# Patient Record
Sex: FEMALE | Race: WHITE | NOT HISPANIC OR LATINO | Employment: UNEMPLOYED | URBAN - METROPOLITAN AREA
[De-identification: names, ages, dates, MRNs, and addresses within clinical notes are randomized per-mention and may not be internally consistent; named-entity substitution may affect disease eponyms.]

---

## 2017-01-06 ENCOUNTER — COMMUNICATION - HEALTHEAST (OUTPATIENT)
Dept: FAMILY MEDICINE | Facility: CLINIC | Age: 37
End: 2017-01-06

## 2017-01-06 DIAGNOSIS — Z00.00 HEALTH CARE MAINTENANCE: ICD-10-CM

## 2017-03-10 ENCOUNTER — COMMUNICATION - HEALTHEAST (OUTPATIENT)
Dept: FAMILY MEDICINE | Facility: CLINIC | Age: 37
End: 2017-03-10

## 2017-03-10 DIAGNOSIS — Z00.00 HEALTH CARE MAINTENANCE: ICD-10-CM

## 2017-04-24 ENCOUNTER — OFFICE VISIT - HEALTHEAST (OUTPATIENT)
Dept: FAMILY MEDICINE | Facility: CLINIC | Age: 37
End: 2017-04-24

## 2017-04-24 DIAGNOSIS — J02.9 ACUTE PHARYNGITIS: ICD-10-CM

## 2017-04-24 RX ORDER — DEXTROAMPHETAMINE SACCHARATE, AMPHETAMINE ASPARTATE MONOHYDRATE, DEXTROAMPHETAMINE SULFATE AND AMPHETAMINE SULFATE 5; 5; 5; 5 MG/1; MG/1; MG/1; MG/1
CAPSULE, EXTENDED RELEASE ORAL
Refills: 0 | Status: SHIPPED | COMMUNITY
Start: 2017-04-20

## 2017-05-04 ENCOUNTER — OFFICE VISIT - HEALTHEAST (OUTPATIENT)
Dept: FAMILY MEDICINE | Facility: CLINIC | Age: 37
End: 2017-05-04

## 2017-05-04 DIAGNOSIS — J01.10 ACUTE NON-RECURRENT FRONTAL SINUSITIS: ICD-10-CM

## 2017-06-12 ENCOUNTER — COMMUNICATION - HEALTHEAST (OUTPATIENT)
Dept: FAMILY MEDICINE | Facility: CLINIC | Age: 37
End: 2017-06-12

## 2017-06-12 DIAGNOSIS — Z00.00 HEALTH CARE MAINTENANCE: ICD-10-CM

## 2017-06-23 ENCOUNTER — COMMUNICATION - HEALTHEAST (OUTPATIENT)
Dept: TELEHEALTH | Facility: CLINIC | Age: 37
End: 2017-06-23

## 2017-06-23 ENCOUNTER — OFFICE VISIT - HEALTHEAST (OUTPATIENT)
Dept: FAMILY MEDICINE | Facility: CLINIC | Age: 37
End: 2017-06-23

## 2017-06-23 DIAGNOSIS — I73.00 RAYNAUD'S DISEASE: ICD-10-CM

## 2017-06-23 DIAGNOSIS — L70.9 ACNE: ICD-10-CM

## 2017-06-23 DIAGNOSIS — Z00.00 ROUTINE GENERAL MEDICAL EXAMINATION AT A HEALTH CARE FACILITY: ICD-10-CM

## 2017-06-23 ASSESSMENT — MIFFLIN-ST. JEOR: SCORE: 1142.62

## 2017-09-01 ENCOUNTER — COMMUNICATION - HEALTHEAST (OUTPATIENT)
Dept: FAMILY MEDICINE | Facility: CLINIC | Age: 37
End: 2017-09-01

## 2017-09-01 DIAGNOSIS — Z00.00 HEALTH CARE MAINTENANCE: ICD-10-CM

## 2017-10-09 ENCOUNTER — COMMUNICATION - HEALTHEAST (OUTPATIENT)
Dept: FAMILY MEDICINE | Facility: CLINIC | Age: 37
End: 2017-10-09

## 2017-10-09 DIAGNOSIS — L70.8 OTHER ACNE: ICD-10-CM

## 2017-10-13 ENCOUNTER — AMBULATORY - HEALTHEAST (OUTPATIENT)
Dept: FAMILY MEDICINE | Facility: CLINIC | Age: 37
End: 2017-10-13

## 2017-10-13 ENCOUNTER — COMMUNICATION - HEALTHEAST (OUTPATIENT)
Dept: FAMILY MEDICINE | Facility: CLINIC | Age: 37
End: 2017-10-13

## 2017-11-16 ENCOUNTER — OFFICE VISIT - HEALTHEAST (OUTPATIENT)
Dept: FAMILY MEDICINE | Facility: CLINIC | Age: 37
End: 2017-11-16

## 2017-11-16 DIAGNOSIS — M54.2 NECK PAIN, CHRONIC: ICD-10-CM

## 2017-11-16 DIAGNOSIS — G89.29 NECK PAIN, CHRONIC: ICD-10-CM

## 2017-11-16 DIAGNOSIS — M50.30 DDD (DEGENERATIVE DISC DISEASE), CERVICAL: ICD-10-CM

## 2017-11-30 ENCOUNTER — HOSPITAL ENCOUNTER (OUTPATIENT)
Dept: MRI IMAGING | Facility: CLINIC | Age: 37
Discharge: HOME | End: 2017-11-30
Attending: FAMILY MEDICINE

## 2017-11-30 DIAGNOSIS — G89.29 NECK PAIN, CHRONIC: ICD-10-CM

## 2017-11-30 DIAGNOSIS — M50.30 DDD (DEGENERATIVE DISC DISEASE), CERVICAL: ICD-10-CM

## 2017-11-30 DIAGNOSIS — M54.2 NECK PAIN, CHRONIC: ICD-10-CM

## 2017-12-04 ENCOUNTER — COMMUNICATION - HEALTHEAST (OUTPATIENT)
Dept: FAMILY MEDICINE | Facility: CLINIC | Age: 37
End: 2017-12-04

## 2017-12-04 DIAGNOSIS — M50.30 DDD (DEGENERATIVE DISC DISEASE), CERVICAL: ICD-10-CM

## 2017-12-27 ENCOUNTER — OFFICE VISIT - HEALTHEAST (OUTPATIENT)
Dept: PHYSICAL THERAPY | Facility: REHABILITATION | Age: 37
End: 2017-12-27

## 2017-12-27 DIAGNOSIS — M50.90 CERVICAL NECK PAIN WITH EVIDENCE OF DISC DISEASE: ICD-10-CM

## 2017-12-27 DIAGNOSIS — R29.3 POOR POSTURE: ICD-10-CM

## 2018-01-04 ENCOUNTER — OFFICE VISIT - HEALTHEAST (OUTPATIENT)
Dept: PHYSICAL THERAPY | Facility: REHABILITATION | Age: 38
End: 2018-01-04

## 2018-01-04 DIAGNOSIS — M50.90 CERVICAL NECK PAIN WITH EVIDENCE OF DISC DISEASE: ICD-10-CM

## 2018-01-04 DIAGNOSIS — R29.3 POOR POSTURE: ICD-10-CM

## 2018-02-02 ENCOUNTER — OFFICE VISIT - HEALTHEAST (OUTPATIENT)
Dept: FAMILY MEDICINE | Facility: CLINIC | Age: 38
End: 2018-02-02

## 2018-02-02 ENCOUNTER — RECORDS - HEALTHEAST (OUTPATIENT)
Dept: GENERAL RADIOLOGY | Facility: CLINIC | Age: 38
End: 2018-02-02

## 2018-02-02 DIAGNOSIS — Z30.9 CONTRACEPTION MANAGEMENT: ICD-10-CM

## 2018-02-02 DIAGNOSIS — S92.912A TOE FRACTURE, LEFT: ICD-10-CM

## 2018-02-02 DIAGNOSIS — Z00.00 ENCOUNTER FOR GENERAL ADULT MEDICAL EXAMINATION WITHOUT ABNORMAL FINDINGS: ICD-10-CM

## 2018-04-09 ENCOUNTER — OFFICE VISIT - HEALTHEAST (OUTPATIENT)
Dept: FAMILY MEDICINE | Facility: CLINIC | Age: 38
End: 2018-04-09

## 2018-04-09 DIAGNOSIS — R10.2 PELVIC PAIN: ICD-10-CM

## 2018-04-09 DIAGNOSIS — R41.0 CONFUSION: ICD-10-CM

## 2018-04-12 ENCOUNTER — COMMUNICATION - HEALTHEAST (OUTPATIENT)
Dept: SCHEDULING | Facility: CLINIC | Age: 38
End: 2018-04-12

## 2018-04-13 ENCOUNTER — OFFICE VISIT - HEALTHEAST (OUTPATIENT)
Dept: FAMILY MEDICINE | Facility: CLINIC | Age: 38
End: 2018-04-13

## 2018-04-13 ENCOUNTER — AMBULATORY - HEALTHEAST (OUTPATIENT)
Dept: FAMILY MEDICINE | Facility: CLINIC | Age: 38
End: 2018-04-13

## 2018-04-13 DIAGNOSIS — K59.00 CONSTIPATION: ICD-10-CM

## 2018-04-13 DIAGNOSIS — R10.9 ABDOMINAL PAIN: ICD-10-CM

## 2018-04-13 DIAGNOSIS — K57.92 DIVERTICULITIS: ICD-10-CM

## 2018-04-17 ENCOUNTER — COMMUNICATION - HEALTHEAST (OUTPATIENT)
Dept: SCHEDULING | Facility: CLINIC | Age: 38
End: 2018-04-17

## 2018-04-17 ENCOUNTER — OFFICE VISIT - HEALTHEAST (OUTPATIENT)
Dept: FAMILY MEDICINE | Facility: CLINIC | Age: 38
End: 2018-04-17

## 2018-04-17 DIAGNOSIS — R06.00 DYSPNEA: ICD-10-CM

## 2018-04-17 DIAGNOSIS — R21 RASH: ICD-10-CM

## 2018-04-21 ENCOUNTER — COMMUNICATION - HEALTHEAST (OUTPATIENT)
Dept: SCHEDULING | Facility: CLINIC | Age: 38
End: 2018-04-21

## 2018-04-21 ENCOUNTER — OFFICE VISIT - HEALTHEAST (OUTPATIENT)
Dept: FAMILY MEDICINE | Facility: CLINIC | Age: 38
End: 2018-04-21

## 2018-04-21 DIAGNOSIS — B37.0 THRUSH: ICD-10-CM

## 2018-04-21 DIAGNOSIS — J40 BRONCHITIS: ICD-10-CM

## 2018-04-21 LAB — DEPRECATED S PYO AG THROAT QL EIA: NORMAL

## 2018-04-23 ENCOUNTER — RECORDS - HEALTHEAST (OUTPATIENT)
Dept: ADMINISTRATIVE | Facility: OTHER | Age: 38
End: 2018-04-23

## 2018-04-23 LAB — GROUP A STREP BY PCR: NORMAL

## 2018-05-07 ENCOUNTER — RECORDS - HEALTHEAST (OUTPATIENT)
Dept: ADMINISTRATIVE | Facility: OTHER | Age: 38
End: 2018-05-07

## 2018-05-08 ENCOUNTER — HOSPITAL ENCOUNTER (OUTPATIENT)
Dept: CT IMAGING | Facility: CLINIC | Age: 38
Discharge: HOME | End: 2018-05-08
Attending: PHYSICIAN ASSISTANT

## 2018-05-08 DIAGNOSIS — K57.92 DIVERTICULITIS: ICD-10-CM

## 2018-05-16 ENCOUNTER — COMMUNICATION - HEALTHEAST (OUTPATIENT)
Dept: FAMILY MEDICINE | Facility: CLINIC | Age: 38
End: 2018-05-16

## 2018-05-16 DIAGNOSIS — L70.8 OTHER ACNE: ICD-10-CM

## 2018-05-22 ENCOUNTER — RECORDS - HEALTHEAST (OUTPATIENT)
Dept: ADMINISTRATIVE | Facility: OTHER | Age: 38
End: 2018-05-22

## 2018-06-11 ENCOUNTER — RECORDS - HEALTHEAST (OUTPATIENT)
Dept: ADMINISTRATIVE | Facility: OTHER | Age: 38
End: 2018-06-11

## 2018-07-02 ENCOUNTER — HOSPITAL ENCOUNTER (OUTPATIENT)
Dept: CT IMAGING | Facility: CLINIC | Age: 38
Discharge: HOME | End: 2018-07-02
Attending: COLON & RECTAL SURGERY

## 2018-07-02 DIAGNOSIS — K57.92 DIVERTICULITIS: ICD-10-CM

## 2018-10-31 ENCOUNTER — OFFICE VISIT - HEALTHEAST (OUTPATIENT)
Dept: FAMILY MEDICINE | Facility: CLINIC | Age: 38
End: 2018-10-31

## 2018-10-31 DIAGNOSIS — L70.8 OTHER ACNE: ICD-10-CM

## 2018-10-31 DIAGNOSIS — Z12.4 SCREENING FOR CERVICAL CANCER: ICD-10-CM

## 2018-10-31 DIAGNOSIS — D25.0 INTRAMURAL AND SUBMUCOUS LEIOMYOMA OF UTERUS: ICD-10-CM

## 2018-10-31 DIAGNOSIS — D25.1 INTRAMURAL AND SUBMUCOUS LEIOMYOMA OF UTERUS: ICD-10-CM

## 2018-10-31 DIAGNOSIS — K57.32 DIVERTICULITIS OF COLON: ICD-10-CM

## 2018-10-31 DIAGNOSIS — R10.2 PELVIC PAIN IN FEMALE: ICD-10-CM

## 2018-10-31 DIAGNOSIS — Z13.228 SCREENING FOR METABOLIC DISORDER: ICD-10-CM

## 2018-10-31 DIAGNOSIS — R53.82 CHRONIC FATIGUE: ICD-10-CM

## 2018-10-31 LAB
CHOLEST SERPL-MCNC: 183 MG/DL
FASTING STATUS PATIENT QL REPORTED: NO
HBA1C MFR BLD: 5 % (ref 3.5–6.1)
HDLC SERPL-MCNC: 91 MG/DL
LDLC SERPL CALC-MCNC: 82 MG/DL
TRIGL SERPL-MCNC: 51 MG/DL
TSH SERPL DL<=0.005 MIU/L-ACNC: 1.73 UIU/ML (ref 0.3–5)

## 2018-10-31 ASSESSMENT — MIFFLIN-ST. JEOR: SCORE: 1162.8

## 2018-11-01 ENCOUNTER — HOSPITAL ENCOUNTER (OUTPATIENT)
Dept: ULTRASOUND IMAGING | Facility: CLINIC | Age: 38
Discharge: HOME | End: 2018-11-01
Attending: FAMILY MEDICINE

## 2018-11-01 ENCOUNTER — COMMUNICATION - HEALTHEAST (OUTPATIENT)
Dept: FAMILY MEDICINE | Facility: CLINIC | Age: 38
End: 2018-11-01

## 2018-11-01 DIAGNOSIS — D25.0 INTRAMURAL AND SUBMUCOUS LEIOMYOMA OF UTERUS: ICD-10-CM

## 2018-11-01 DIAGNOSIS — R10.2 PELVIC PAIN IN FEMALE: ICD-10-CM

## 2018-11-01 DIAGNOSIS — D25.1 INTRAMURAL AND SUBMUCOUS LEIOMYOMA OF UTERUS: ICD-10-CM

## 2018-11-02 ENCOUNTER — COMMUNICATION - HEALTHEAST (OUTPATIENT)
Dept: FAMILY MEDICINE | Facility: CLINIC | Age: 38
End: 2018-11-02

## 2018-11-02 DIAGNOSIS — R10.2 PELVIC PAIN IN FEMALE: ICD-10-CM

## 2018-11-19 ENCOUNTER — COMMUNICATION - HEALTHEAST (OUTPATIENT)
Dept: FAMILY MEDICINE | Facility: CLINIC | Age: 38
End: 2018-11-19

## 2018-11-19 DIAGNOSIS — L70.8 OTHER ACNE: ICD-10-CM

## 2018-12-07 ENCOUNTER — RECORDS - HEALTHEAST (OUTPATIENT)
Dept: ADMINISTRATIVE | Facility: OTHER | Age: 38
End: 2018-12-07

## 2019-02-13 ENCOUNTER — COMMUNICATION - HEALTHEAST (OUTPATIENT)
Dept: ADMINISTRATIVE | Facility: CLINIC | Age: 39
End: 2019-02-13

## 2019-02-19 ENCOUNTER — COMMUNICATION - HEALTHEAST (OUTPATIENT)
Dept: FAMILY MEDICINE | Facility: CLINIC | Age: 39
End: 2019-02-19

## 2019-02-19 DIAGNOSIS — Z12.31 VISIT FOR SCREENING MAMMOGRAM: ICD-10-CM

## 2019-02-19 DIAGNOSIS — L70.8 OTHER ACNE: ICD-10-CM

## 2019-02-21 RX ORDER — CLINDAMYCIN PHOSPHATE 10 MG/G
GEL TOPICAL
Qty: 60 G | Refills: 0 | Status: SHIPPED | OUTPATIENT
Start: 2019-02-21 | End: 2022-04-11

## 2019-03-11 ENCOUNTER — HOSPITAL ENCOUNTER (OUTPATIENT)
Dept: CT IMAGING | Facility: CLINIC | Age: 39
Discharge: HOME | End: 2019-03-11
Attending: COLON & RECTAL SURGERY

## 2019-03-11 ENCOUNTER — RECORDS - HEALTHEAST (OUTPATIENT)
Dept: ADMINISTRATIVE | Facility: OTHER | Age: 39
End: 2019-03-11

## 2019-03-11 DIAGNOSIS — K57.32 DIVERTICULITIS LARGE INTESTINE W/O PERFORATION OR ABSCESS W/O BLEEDING: ICD-10-CM

## 2019-03-12 ENCOUNTER — RECORDS - HEALTHEAST (OUTPATIENT)
Dept: ADMINISTRATIVE | Facility: OTHER | Age: 39
End: 2019-03-12

## 2019-06-11 ENCOUNTER — OFFICE VISIT - HEALTHEAST (OUTPATIENT)
Dept: FAMILY MEDICINE | Facility: CLINIC | Age: 39
End: 2019-06-11

## 2019-06-11 DIAGNOSIS — M25.469 KNEE SWELLING: ICD-10-CM
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
## 2019-06-11 DIAGNOSIS — M79.651 PAIN IN BOTH THIGHS: ICD-10-CM

## 2019-06-11 DIAGNOSIS — M79.652 PAIN IN BOTH THIGHS: ICD-10-CM

## 2019-06-11 LAB — CK SERPL-CCNC: 129 U/L (ref 30–190)

## 2019-06-12 ENCOUNTER — COMMUNICATION - HEALTHEAST (OUTPATIENT)
Dept: FAMILY MEDICINE | Facility: CLINIC | Age: 39
End: 2019-06-12

## 2019-06-12 LAB — B BURGDOR IGG+IGM SER QL: 0.14 INDEX VALUE

## 2021-02-01 ENCOUNTER — TRANSFERRED RECORDS (OUTPATIENT)
Dept: MULTI SPECIALTY CLINIC | Facility: CLINIC | Age: 41
End: 2021-02-01
Payer: COMMERCIAL

## 2021-02-01 LAB
HPV ABSTRACT: NORMAL
PAP SMEAR - HIM PATIENT REPORTED: NORMAL

## 2021-05-29 ENCOUNTER — RECORDS - HEALTHEAST (OUTPATIENT)
Dept: ADMINISTRATIVE | Facility: CLINIC | Age: 41
End: 2021-05-29

## 2021-05-29 NOTE — TELEPHONE ENCOUNTER
Left message to call back for: Britney  Information to relay to patient:      ----- Message from Niru Crawley MD sent at 6/12/2019  2:56 PM CDT -----  Dear Britney,    It was a pleasure to see you in the office the other day.    I reviewed Your  recent  lab results  Showed no risk for lyme or muscle injury , more likely pulled muscle   If would like we can refer you to physical therapy      Please feel free to call back for any concerns or questions.  Thanks for choosing our clinic  for your care.    Niru Crawley MD

## 2021-05-29 NOTE — TELEPHONE ENCOUNTER
"Patient Returning Call  Reason for call:  results  Information relayed to patient:  The writer read the following to patient per Dr Crawley :    It was a pleasure to see you in the office the other day.     I reviewed Your  recent  lab results  Showed no risk for lyme or muscle injury , more likely pulled muscle   If would like we can refer you to physical therapy       Please feel free to call back for any concerns or questions.  Thanks for choosing our clinic  for your care.     Niru Crawley MD      Patient has additional questions:  No  If YES, what are your questions/concerns:  She does not want order for PT at this time. \" Thank you Dr Crawley\"  Okay to leave a detailed message?: No call back needed  "

## 2021-05-29 NOTE — PROGRESS NOTES
Assessment/plan   Britney Jenkins is a 38 y.o. female who is  establish patient to my practice here with   Chief Complaint   Patient presents with     Leg Pain     bilateral pain in quads x2 weeks when moving quickly (sprinting/squats) and bruising on the right knee; concerns for clots or lymes, declines any injuries, admits to taking Clindamycin for a UTI - Sx started after taking         Britney was seen today for leg pain.    Diagnoses and all orders for this visit:    Pain in both thighs  -     JIC LAV    Knee swelling  -     CK Total    Insect bite, initial encounter  -     Lyme Antibody [place]        Very lengthy conversation with the patient about different etiology which could contribute to her symptoms.  Including pulled muscle versus muscle injury, tick bite initial reaction which is like flulike symptoms versus Lyme disease total of half an hour was discussed with the patient as patient had read a lot of things on Google and wanted to have a lengthy discussion.  I did appreciate her knowledge but looks like it  created more anxiety for her  Will do a CK enzyme level to make sure there is no acute muscle injury, Lyme cascade panel follow-up in the result as soon as possible  Measures discussed for bites  Has had a multiple mosquito bites on her neck arm on leg area negative for  Any sign of DVT or thrombophlebitis      Subjective:      HPI: Britney Jenkins is a 38 y.o. female is here for concern of bilateral thigh pain, along with the right side knee swelling and bruise.  Patient describe her pain as very sore pulled muscle she able to walk and jog okay but if she jump is feel very painful.  She denies any active injury to the knee or thigh.  She works as a  and after [employer] and there is a quite high risk for deer tick bite so she is concerned about Lyme disease.  Patient recently also had taken clindamycin for UTI and back to vaginosis  Currently patient has just muscle soreness,  knee bruises improving.  Patient overall does have a hyperextended knee and I did not appreciate much swelling of the knee today for that reason no further testing was done          I have personally reviewed the patient's allergies, medications, past medical history, family history, social history, rooming notes and problem list in detail and updated the patient record as necessary.      Past Medical History:   Diagnosis Date     ADD (attention deficit disorder)      Depression      No past surgical history on file.  Patient has no known allergies.  Current Outpatient Medications   Medication Sig Dispense Refill     benzoyl peroxide 5 % external liquid Apply twice a day as directed 227 g 0     biotin 300 mcg Tab Take 1 tablet by mouth daily. 90 each 1     cholecalciferol, vitamin D3, 2,000 unit Tab Take by mouth.       clindamycin (CLINDAGEL) 1 % gel APPLY EXTERNALLY TO THE AFFECTED AREA TWICE DAILY 60 g 0     dextroamphetamine-amphetamine (ADDERALL XR) 20 MG 24 hr capsule TAKE ONE CAPSULE BY MOUTH EVERY DAY  0     MULTIVIT &MINERALS/FERROUS FUM (MULTI VITAMIN ORAL) Take 1 tablet by mouth daily.       psyllium husk (METAMUCIL) 0.4 gram cap Take by mouth. Metamucil Tablets       No current facility-administered medications for this visit.      Family History   Problem Relation Age of Onset     Cervical cancer Mother 35     Thyroid disease Mother 45     Cancer Father 60        LN     Diabetes Maternal Grandmother      Dementia Maternal Grandfather      Dementia Paternal Grandmother        Patient Active Problem List   Diagnosis     Acne     Vitamin D Deficiency     Vaginal Discharge     Cramp Of Limb     Decreased Concentrating Ability     Diverticulitis of large intestine without perforation or abscess without bleeding     Back problem       Review of Systems   12 point comprehensive review of systems was negative except as noted and HPI     Social History     Social History Narrative            2  Children       Objective:     Vitals:    06/11/19 1226   BP: 102/60   Pulse: 78   Weight: 117 lb 12.8 oz (53.4 kg)       Physical Exam:   Physical Exam:  General Appearance:  Appears comfortable, Alert, cooperative, no distress,   Head: Normocephalic, without obvious abnormality, atraumatic  Eyes: PERRL, conjunctiva/corneas clear, EOM's intact, both eyes             Nose: Nares normal, no drainage   Throat: Lips, mucosa, and tongue normal; teeth and gums normal  Neck: Supple, symmetrical, trachea midline, no adenopathy;                      Lungs: Clear to auscultation bilaterally, respirations unlabored  Heart: Regular rate and rhythm, S1 and S2 normal, no murmur, rubs or gallop  Abdomen: Soft, non-tender, bowel sounds active all four quadrants,   no masses, no organomegaly  Extremities: Extremities normal, positive mild bruise on the right knee no knee swelling range of motion intact both knees are knocked and hyperextended  No cough muscle tenderness, squatting causing little bit of quad strain   Pulses: DP pulses are 1-2+ bilat.    Skin: no rashes or lesions  Neurologic: normal and equal strength bilat in upper and lower extremities        This note has been dictated using voice recognition software. Any grammatical or context distortions are unintentional and inherent to the software  Niru Crawley MD

## 2021-05-30 VITALS — BODY MASS INDEX: 22.26 KG/M2 | WEIGHT: 118.8 LBS

## 2021-05-30 VITALS — WEIGHT: 117.7 LBS | BODY MASS INDEX: 22.06 KG/M2

## 2021-05-31 VITALS — BODY MASS INDEX: 24.19 KG/M2 | WEIGHT: 128 LBS

## 2021-05-31 VITALS — BODY MASS INDEX: 23.26 KG/M2 | WEIGHT: 123.1 LBS

## 2021-05-31 VITALS — BODY MASS INDEX: 22.28 KG/M2 | WEIGHT: 118 LBS | HEIGHT: 61 IN

## 2021-06-01 ENCOUNTER — RECORDS - HEALTHEAST (OUTPATIENT)
Dept: ADMINISTRATIVE | Facility: CLINIC | Age: 41
End: 2021-06-01

## 2021-06-01 VITALS — WEIGHT: 127 LBS | BODY MASS INDEX: 24 KG/M2

## 2021-06-01 VITALS — WEIGHT: 129 LBS | BODY MASS INDEX: 24.37 KG/M2

## 2021-06-01 VITALS — WEIGHT: 126.4 LBS | BODY MASS INDEX: 23.88 KG/M2

## 2021-06-02 ENCOUNTER — RECORDS - HEALTHEAST (OUTPATIENT)
Dept: ADMINISTRATIVE | Facility: CLINIC | Age: 41
End: 2021-06-02

## 2021-06-02 VITALS — HEIGHT: 62 IN | BODY MASS INDEX: 22.21 KG/M2 | WEIGHT: 120.7 LBS

## 2021-06-03 VITALS — BODY MASS INDEX: 21.9 KG/M2 | WEIGHT: 117.8 LBS

## 2021-06-10 NOTE — PROGRESS NOTES
Assessment/Plan:        Diagnoses and all orders for this visit:    Acute pharyngitis  -     Rapid Strep A Screen-Throat  -     Group A Strep, RNA Direct Detection, Throat    Other orders  -     dextroamphetamine-amphetamine (ADDERALL XR) 20 MG 24 hr capsule; TAKE ONE CAPSULE BY MOUTH EVERY DAY; Refill: 0        Rapid strep was done which came back negative.  Will send that for culture.  Fluid hydration, precautionary measures.  If positive, will treat her with Penicillin VK.  OTC Cepacol lozenges, Chloraseptic spray as needed.  She was agreeable with the plans.  Subjective:    Patient ID: Britney Jenkins is a 36 y.o. female.    HPI    Britney is here with concerns about sore throat.  Started having symptoms 2 days ago.  Feels off.  She also developed sneezing, runny nose yesterday.  Felt worse last night with her sore throat.  Both her kids have strep.  Her daughter who is 6 years of age is currently receiving treatment.  Her 10-year-old son just finished treatment.  He did develop sore throat again after treatment and repeat check was negative for strep.  She was in [place] 3 weeks ago and was sick with GI issues then.  She works in a school.  No fever, nausea, vomiting.  No cough.  Has been trying to take ibuprofen and switch to Aleve with slight benefit.  No history of smoking, surgery done to her tonsils before.  No frequent strep infections in the past.    Review of Systems  As above otherwise negative.          Objective:    Physical Exam  /60 (Patient Site: Left Arm, Patient Position: Sitting, Cuff Size: Adult Regular)  Pulse 80  Temp 98.1  F (36.7  C) (Oral)   Wt 117 lb 11.2 oz (53.4 kg)  LMP 04/12/2017 (Approximate)  SpO2 100%  Breastfeeding? No  BMI 22.06 kg/m2    Vital signs noted above. AAO ×3.  HEENT no nasal discharge, moist oral mucosa. Ears:TMs intact, no fluid collection. Neck: Supple neck, nonpalpable cervical lymph nodes.  Lungs: Clear to auscultation bilateral.  Heart: S1-S2  regular rate and rhythm, no murmurs were noted.  Abdomen:  with bowel sounds.  Extremities: Pulses were full and equal.

## 2021-06-10 NOTE — PROGRESS NOTES
Provider wore a mask during this visit.     Subjective:   Britney Jenkins is a 36 y.o. female  No question data found.  Chief Complaint   Patient presents with     Sinus Problem     Longer than 2 weeks...Most Sx in sinus area.      Shortness of Breath     Barky cough in am.     Fatigue   On 4/22 symptoms started with a cold and sore throat. She felt like she was getting better and then started to feel bad. Has had a lot of nasal congestion, lots green nasal drainage. Admits sinus and ear pressure, headaches, sore throat, but not fevers or chills. Admits fatigue, and appetite has been down. Denies SOB or CP, but feeling something in her chest when she coughs. Missed one day of work. Says her  got ill after she did. Says her sense of smell is down.   Review of Systems  Const - Resp - see HPI  No Known Allergies    Current Outpatient Prescriptions:      dextroamphetamine-amphetamine (ADDERALL XR) 20 MG 24 hr capsule, TAKE ONE CAPSULE BY MOUTH EVERY DAY, Disp: , Rfl: 0     MULTIVIT &MINERALS/FERROUS FUM (MULTI VITAMIN ORAL), Take 1 tablet by mouth daily., Disp: , Rfl:      norgestimate-ethinyl estradiol (TRI-ESTARYLLA) 0.18/0.215/0.25 mg-35 mcg (28) Tab tablet, Take 1 tablet by mouth daily. No further refills until seen for a physical., Disp: 56 tablet, Rfl: 0     albuterol (PROVENTIL HFA;VENTOLIN HFA) 90 mcg/actuation inhaler, Inhale 2 puffs every 6 (six) hours as needed for wheezing., Disp: 1 Inhaler, Rfl: 0     azithromycin (ZITHROMAX) 250 MG tablet, 2 tabs now and one tab daily for 4 additional days, Disp: 6 tablet, Rfl: 0     dextroamphetamine-amphetamine (ADDERALL XR) 10 MG 24 hr capsule, Take 10 mg by mouth 2 (two) times a day., Disp: , Rfl:   Patient Active Problem List   Diagnosis     Acne     Vitamin D Deficiency     Vaginal Discharge     Cramp Of Limb     Decreased Concentrating Ability     Medical History Reviewed  Objective:     Vitals:    05/04/17 1640   BP: 92/58   Pulse: 66   Resp: 10   Temp:  98.4  F (36.9  C)   TempSrc: Oral   SpO2: 98%   Weight: 118 lb 12.8 oz (53.9 kg)   Gen - Pt in NAD  Eyes - Conjunctiva non injected, no drainage  Face - non TTP over maxillary and mildly TTP over frontal sinus areas  Ears - external canals - no induration, Right TM - non injected, Left TM - not injected   Nose - not congested, no nasal drainage  Pharynx - non injected, tonsils 2+ size  Neck - supple, no cervical adenopathy, no masses  Cor - RRR w/o murmur  Lungs - good air entry; no wheezes or crackles noted on auscultation - dry coughing noted on request  Skin - no lesions, no rashes noted     Assessment - Plan     1. Acute non-recurrent frontal sinusitis  amoxicillin-clavulanate (AUGMENTIN) 875-125 mg per tablet - 1 tab bid x 7 days       At the conclusion of the encounter, assessment and plan were discussed.   All questions were answered.   The patient or guardian acknowledged understanding and was involved in the decision making regarding the overall care plan.    Patient Instructions     1. Keep well hydrated  2. If symptoms not improved after completing antibiotics, follow up with primary  3. If you have any questions, call the clinic number    Acute Bacterial Rhinosinusitis (ABRS)  Acute bacterial rhinosinusitis (ABRS) is an infection of your nasal cavity and sinuses. It s caused by bacteria. Acute means that you ve had symptoms for less than 12 weeks.  Understanding your sinuses  The nasal cavity is the large air-filled space behind your nose. The sinuses are a group of spaces formed by the bones of your face. They connect with your nasal cavity. ABRS causes the tissue lining these spaces to become inflamed. Mucus may not drain normally. This leads to facial pain and other symptoms.  What causes ABRS?  ABRS most often follows an upper respiratory infection caused by a virus. Bacteria then infect the lining of your nasal cavity and sinuses. But you can also get ABRS if you have:    Nasal  allergies    Long-term nasal swelling and congestion not caused by allergies    Blockage in the nose  Symptoms of ABRS  The symptoms of ABRS may be different for each person, and can include:    Nasal congestion    Runny nose    Fluid draining from the nose down the throat (postnasal drip)    Headache    Cough    Pain in the sinuses    Thick, colored fluid from the nose (mucus)    Fever  Diagnosing ABRS  ABRS may be diagnosed if you ve had an upper respiratory infection like a cold and cough for longer than 10 to 14 days. Your health care provider will ask about your symptoms and your medical history. The provider will check your vital signs, including your temperature. You ll have a physical exam. The health care provider will check your ears, nose, and throat. You likely won t need any tests. If ABRS comes back, you may have a culture or other tests.  Treatment for ABRS  Treatment may include:    Antibiotic medicine. This is for symptoms that last for at least 10 to 14 days.    Nasal corticosteroid medicine. Drops or spray used in the nose can lessen swelling and congestion.    Over-the-counter pain medicine. This is to lessen sinus pain and pressure.    Nasal decongestant medicine. Spray or drops may help to lessen congestion. Do not use them for more than a few days.    Salt wash (saline irrigation). This can help to loosen mucus.  Possible complications of ABRS  ABRS may come back or become long-term (chronic).  In rare cases, ABRS may cause complications such as:     Inflamed tissue around the brain and spinal cord (meningitis)    Inflamed tissue around the eyes (orbital cellulitis)    Inflamed bones around the sinuses (osteitis)  These problems may need to be treated in a hospital with intravenous (IV) antibiotic medicine or surgery.  When to call the health care provider  Call your health care provider if you have any of the following:    Symptoms that don t get better, or get worse    Symptoms that don t  get better after 3 to 5 days on antibiotics    Trouble seeing    Swelling around your eyes    Confusion or trouble staying awake        9812-1562 [employer]. [address] [place] [address], [place] [address]. All rights reserved. This information is not intended as a substitute for professional medical care. Always follow your healthcare professional's instructions.

## 2021-06-11 NOTE — PROGRESS NOTES
Assessment/Plan:        Diagnoses and all orders for this visit:    Routine general medical examination at a health care facility  -     Lipid Cascade; Future; Expected date: 6/24/17  -     HM2(CBC w/o Differential); Future; Expected date: 6/24/17  -     Glucose; Future; Expected date: 6/24/17    Acne    Raynaud's disease    Other orders  -     clindamycin-benzoyl peroxide (BENZACLIN) gel; Apply over affected area twice a day  Dispense: 50 g; Refill: 0        She is not fasting.  We will send for future lab orders.  Will hold off on Pap smear at this time.  She is improved food choices.  Continue regular exercise.  Will hold off on mammogram at this time until she turns 40.  Monitor for breast changes.  For her acne, consider BenzaClin.  Update us in a month and if persistent, consider dermatology referral.  Encourage annual physical.  She was agreeable with the plans.  Subjective:    Patient ID: Britney Jenkins is a 36 y.o. female.    [facility]    Britney is here for her physical.  She had her last Pap smear in February 2016, normal.  She had an abnormal Pap smear may be in 2000 or 2002.  Subsequent Pap smears have been good.  She denies any vaginal discharge, bleeding.    Wonders about seeing dermatology.  She was last seen 10 years ago for acne.  Was given Retin-A with minimal benefit.  She feels that her skin is flaring up especially past 6 months.  She has been trying to wash her face up to 3 times a day which helps a bit.  Tried several over-the-counter facial wash.  She was given clindamycin in the past, adapalene, sulfacetamide.  She tried salicylic acid which helps a bit.  Would sometimes have significant inflammatory lesions.    She wonders about 3 kn.  She has been dealing with this for a number of years.  Drink cold seasons are winter, her fingers would feel numb and would be whitish on the tip.  She denies any changes recently but wonders if she needs to be concerned about this.  She tries to run her hands in  "warm water or take hot shower which helps.  She denies any limitations with it.  No other discoloration.    Review of Systems  As above otherwise negative.    Past medical history, surgery and family history reviewed and as above.    Social history: Denies any issues with smoking.  Rare alcohol use.  She tries to run 2-3 days a week, 2-3 months.  Walks 2-3 days a week, 1-2 miles.  She tries to do weights twice a week.  She has been trying to eat good.  No fast foods. Some red meat, chips, cereal. Works as a substitute paraprofessional.        Objective:    Physical Exam  /60 (Patient Site: Left Arm, Patient Position: Sitting, Cuff Size: Adult Regular)  Pulse 85  Ht 5' 1\" (1.549 m)  Wt 118 lb (53.5 kg)  LMP 06/09/2017 (Approximate)  SpO2 98%  Breastfeeding? No  BMI 22.3 kg/m2    Vital signs noted above. AAO ×3.  HEENT negative.  Neck: Supple neck, nonpalpable cervical lymph nodes. No thyromegaly. Lungs: Clear to auscultation bilateral.  Heart: S1-S2 regular rate and rhythm, no murmurs were noted.  Abdomen: Flabby, soft with bowel sounds and nontender.  Extremities: No edema, pulses were full and equal. Breast exam: Dense breasts.  No nipple bleeding or discharge, no mass or tenderness, no axillary lymphadenopathy.  Pelvic exam: Negative CMT, no adnexal mass or tenderness.  Skin: Minimal inflammatory acne lesions on her face, some closed comedones.           "
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
## 2021-06-15 NOTE — PROGRESS NOTES
SUBJECTIVE:  Britney Jenkins is a 37 y.o. female who sustained a left toe injury 2 days ago while doing yoga while doing down dog and bringing her toe forward left side 5th toe stuck at the juna  .  Immediate symptoms: immediate pain, delayed swelling, was able to bear weight directly after injury, no deformity was noted by the patient. Symptoms have been acute since that time. Prior history of related problems: no prior problems with this area in the past.    OBJECTIVE:  Vital signs as noted above.  Appearance: alert, well appearing, and in no distress.  Foot exam: soft tissue swelling and tenderness at the 5th toe bruised .  X-ray:   FINDINGS: On one view there is a subtle oblique lucency through the distal aspect of the proximal phalanx of the left small toe this does not definitely cross cortical surface. It could represent a nondisplaced hairline fracture versus an artifact from   overlying soft tissues if symptoms persist follow-up radiograph in 2 weeks may be helpful to assess for occult fracture.  ASSESSMENT:  Toe contusion and fracture    PLAN:  rest the injured area as much as practical, apply ice packs, elevate the injured limb, prescription for NSAID given, patient given copy of X-Ray, Follow up xray if no improvement   As per radiologist ?? Hairline fracture   See orders for this visit as documented in the electronic medical record.

## 2021-06-15 NOTE — PROGRESS NOTES
[facility]   Cervical Thoracic Initial Evaluation    Patient Name: Britney Jenkins  Date of evaluation: 12/27/2017  Referral Diagnosis: DDD (degenerative disc disease), cervical  Referring provider: Niru Crawley MD  Visit Diagnosis:     ICD-10-CM    1. Cervical neck pain with evidence of disc disease M50.90    2. Poor posture R29.3        Assessment:      Patient is a 37 y.o. female that presents with signs and symptoms consistent with L sided neck and shoulder pain secondary to Mild degenerative disc disease C4-C5 through C6-C7 per MRI imaging. Patient demonstrates impairments including decreased flexibility, tissue extensibility and joint mobility with poor postural awareness leading to impaired functional mobility. Patient's functional limitations include sleeping at night, lifting above head and performing daily household mobility. Today patient responded well to manual therapy and therapeutic exercise.  Patient educated on and demonstrated understanding of nature of impairment, plan of care, patient role and HEP. Patient compliant with PT and prognosis is good. Patient would benefit from skilled PT to progress and improve above impairments.      The POC is dynamic and will be modified on an ongoing basis.  Patient will return to clinic if symptoms persist.  Barriers to achieving goals as noted in the assessment section may affect outcome.  Prognosis to achieve goals is  fair   Pt. is appropriate for skilled PT intervention as outlined in the Plan of Care (POC).  Pt. is a good candidate for skilled PT services to improve pain levels and function.    Goals:  Pt. will be independent with home exercise program in : 4 weeks  Pt. will have improved quality of sleep: with less pain;waking less times/night;in 4 weeks  Pt. will improve posture : and demonstrate posture with minimal to no cuing;in sitting;in standing;in 6 weeks  Patient will look up / down: for reading;for computer work;with full ROM;with  no pain;with less difficulty;in 6 weeks  Patient will reach / maintain arm movement: forward;overhead;for home chores;for dressing;with no pain;with less difficulty;in 6 weeks  Pt will: demonstrate increased flexibility and tissue extensibility of bilateral UT and rhomboids by 5 weeks.     Patient's expectations/goals are realistic.    Barriers to Learning or Achieving Goals:  No Barriers.  Non- adherence to the home exercise program.       Plan / Patient Instructions:        Plan of Care:   Communication with: Referral Source  Patient Related Instruction: Nature of Condition;Treatment plan and rationale;Basis of treatment;Self Care instruction;Body mechanics;Posture;Next steps;Expected outcome  Times per Week: 1-2  Number of Weeks: 6  Number of Visits: 12  Therapeutic Exercise: ROM;Stretching;Strengthening  Neuromuscular Reeducation: kinesio tape;posture  Manual Therapy: soft tissue mobilization;myofascial release;joint mobilization;muscle energy  Modalities: traction;TENS    POC and pathology of condition were reviewed with patient.  Pt. is in agreement with the Plan of Care  A Home Exercise Program (HEP) was initiated today.  Pt. was instructed in exercises by PT and patient was given a handout with detailed instructions.    Plan for next visit: review HEP, STM to rhomboids and cervical spine, SNAG exercises, kinesiotape, theracane, theraband rows, LT activation     Subjective:       History of Present Illness:    Britney is a 37 y.o. female who presents to therapy today with complaints of neck and shoulder pain. Date of onset is off and on for many years and onset was it has gotten to the point where it isn't getting any better, possibly began in June or July. Chiropractic adjustments seem to help but she has to continually go back, she finds benefit but it is still there. Symptoms are constant and getting better. Patient reports she sometimes gets shooting pains that goes to back of neck and shoulders, it  immobilizes her and everything gets hot and numb - happens when she sometimes turns her head or talks, tends to happen when she gets an adjustment, very random. She reports  a constant  history of similar symptoms. She describes their previous level of function as not limited    Pain Rating:3  Pain rating at best: 4  Pain rating at worst: 8  Pain description: tingling and diffuse pain, tight muscle pain, pulling sensation    Functional limitations are described as occurring with:   lifting  performing routine daily activities  Sleeping - she tries to sleep on her back  household activity, moving her neck    Patient reports benefit from:  chiropractic care         Objective:      Note: Items left blank indicates the item was not performed or not indicated at the time of the evaluation.    Patient Outcome Measures :    No Data Recorded today   Scores range from 0-100%, where a score of 0% represents minimal pain and maximal function. The minmal clinically important difference is a score reduction of 10%.    Cervical Thoracic Examination  1. Cervical neck pain with evidence of disc disease     2. Poor posture       Involved side: Left  Posture Observation:      General sitting posture is  fair.  General standing posture is normal.    Cervical ROM:    Date: 12/27/2017     *Indicate scale AROM AROM AROM   Cervical Flexion 62 with a stretch     Cervical Extension 62      Right Left Right Left Right Left   Cervical Sidebending 51 55       Cervical Rotation 75 tightness on L 75 tightness on L       Cervical Protraction      Cervical Retraction      Thoracic Flexion      Thoracic Extension      Thoracic Sidebending         Thoracic Rotation           Strength   WFL and no pain reproduction  Date: 12/27/2017     Cervical Myotomes/5 Right Left Right Left Right Left   Cervical Flexion (C1-2)         Cervical Sidebending (C3)         Shoulder Elevation (C4)         Shoulder Abduction (C5)         Elbow Flexion (C6)         Elbow  Extension (C7)         Wrist Flexion (C7)         Wrist Extension (C6)         Thumb abduction (C8)         Finger Abduction (T1)           Sensation   WFL      Reflex Testing  Cervical Dermatomes Right Left UE Reflexes Right Left   Back of the Head (C2)   Biceps (C5-6)     Supraclavicular Fossa (C3)   Brachioradialis (C5-6)     AC Joint (C4)   Triceps (C7-8)     Lateral Biceps (C5)   Tamera s test     Palmar Thumb (C6)   LE Reflexes     Palmar 3rd Finger (C7)   Patellar (L3-4)     Palmar 5th Finger (C8)   Achilles (S1-2)     Ulnar Forearm (T1)   Babinski Response         Cervical Special Tests     Cervical Special Tests Right Left UE Nerve Mobility Right Left   Cervical compression   Median nerve     Cervical distraction  Increased sensation in supine Ulnar nerve     Spurling s test   Radial nerve     Shoulder abduction sign   Thoracic outlet     Deep neck flexor endurance test   Sherri     Upper cervical rotation   Adson s     Sharper-Nahid   Cervical rotation lateral flexion     Alar ligament test   Other:     Other:   Other:         Flexibility/Tissue Extensibility: decreased of L>R UT, levator and cervical paraspinals  Palpation: TTP of L>R UT, pectorals, supraspinatus, levators  Passive Mobility-Joint Integrity: Hypomobile.  slight R rotation      Treatment Today      Patient Education: Patient educated on plan of care, prognosis, PT/patient role and HEP. Patient educated on impairments related to condition and reproduction of symptoms. Patient instructed to focus on the small goals and this may be a long process to recovery, and that exercises at home are just as important as coming to therapy. Patient was educated on activity modification and exercise consistency in order to see change and progress. Patient demonstrated and verbalized understanding.     Manual Therapy:  STM to bilateral UT, levators and cervical upglides on R to decrease rotation     Exercises:  Exercise #1: supine chin tuck - hold 5 sec x  10  Comment #1: UT/levator stretch - hold 30 sec x 2  Exercise #2: scapular retraction - hold 5 sec   Comment #2: pec doorway stretch - hold 30 sec x 2      TREATMENT MINUTES COMMENTS   Evaluation 20    Self-care/ Home management     Manual therapy 10 STM to bilateral UT, levators and cervical upglides on R to decrease rotation    Neuromuscular Re-education     Therapeutic Activity     Therapeutic Exercises 13 See flowsheet   Gait training     Modality__________________                Total 43 Patient arrived 5 minutes late and required 10 minutes to fill out paperwork   Blank areas are intentional and mean the treatment did not include these items.     PT Evaluation Code: (Please list factors)  Patient History/Comorbidities: cervical DDD  Examination: decreased flexibility, tissue extensibility, and strength of cervical spine  Clinical Presentation: uncomplicated  Clinical Decision Making: low    Patient History/  Comorbidities Examination  (body structures and functions, activity limitations, and/or participation restrictions) Clinical Presentation Clinical Decision Making (Complexity)   No documented Comorbidities or personal factors 1-2 Elements Stable and/or uncomplicated Low   1-2 documented comorbidities or personal factor 3 Elements Evolving clinical presentation with changing characteristics Moderate   3-4 documented comorbidities or personal factors 4 or more Unstable and unpredictable High                Skye Tucker, PT  12/27/2017  11:35 AM

## 2021-06-15 NOTE — PROGRESS NOTES
[facility] Discharge Summary  Patient Name: Britney Jenkins  Date: 2/13/2018  Referral Diagnosis: cervical DDD  Referring provider: Zena Shane MD  Visit Diagnosis:   1. Cervical neck pain with evidence of disc disease     2. Poor posture         Goals:  Pt. will be independent with home exercise program in : 4 weeks  Pt. will have improved quality of sleep: with less pain;waking less times/night;in 4 weeks  Pt. will improve posture : and demonstrate posture with minimal to no cuing;in sitting;in standing;in 6 weeks  Patient will look up / down: for reading;for computer work;with full ROM;with no pain;with less difficulty;in 6 weeks  Patient will reach / maintain arm movement: forward;overhead;for home chores;for dressing;with no pain;with less difficulty;in 6 weeks  Pt will: demonstrate increased flexibility and tissue extensibility of bilateral UT and rhomboids by 5 weeks.     Patient was seen for 2 visits for physical therapy of cervical DDD from 12/27/17 to 1/4/18 with 2 canceled follow up appointments.   The patient attended therapy initially, but did not finish the therapy sessions prescribed.  Goals were not fully achieved. Explanation for goals not achieved: Patient did not return to measure goals.  The patient discontinued therapy, did not return.  No further therapy is required at this time.    Therapy will be discontinued at this time.  The patient will need a new referral to resume physical therapy treatment. Please see below for patient's current status.    Thank you for your referral.  Skye Tucker, PT, DPT  2/13/2018   10:06 AM      [facility] Daily Progress     Patient Name: Britney Jenkins  Date: 1/4/2018  Visit #: 2/12  Referral Diagnosis: cervical DDD  Referring provider: Zena Shane MD  Visit Diagnosis:     ICD-10-CM    1. Cervical neck pain with evidence of disc disease M50.90    2. Poor posture R29.3        Assessment:     Patient is a 37 y.o.  female that presents with signs and symptoms consistent with L sided neck and shoulder pain secondary to Mild degenerative disc disease C4-C5 through C6-C7 per MRI imaging. Patient demonstrates impairments including decreased flexibility, tissue extensibility and joint mobility with poor postural awareness leading to impaired functional mobility. Patient's functional limitations include sleeping at night, lifting above head and performing daily household mobility.    Today patient reports she might feel a little worse since last visit, however turning her head L/R isn't bothering her as much, it is just the tightness of her muscles and decreased joint mobility/stability of her L>R shoulder.    HEP/POC compliance is  good .  Patient demonstrates understanding/independence with home program.  Patient is appropriate to continue with skilled physical therapy intervention, as indicated by initial plan of care.    Goal Status:  Pt. will be independent with home exercise program in : 4 weeks  Pt. will have improved quality of sleep: with less pain;waking less times/night;in 4 weeks  Pt. will improve posture : and demonstrate posture with minimal to no cuing;in sitting;in standing;in 6 weeks  Patient will look up / down: for reading;for computer work;with full ROM;with no pain;with less difficulty;in 6 weeks  Patient will reach / maintain arm movement: forward;overhead;for home chores;for dressing;with no pain;with less difficulty;in 6 weeks  Pt will: demonstrate increased flexibility and tissue extensibility of bilateral UT and rhomboids by 5 weeks.       Plan / Patient Education:     Continue with initial plan of care.  Progress with home program as tolerated.    Plan for next visit: review HEP, STM to rhomboids and cervical spine, SNAG exercises, kinesiotape, LT activation    Subjective:     Pain Ratin  Patient reports she might be a little worse since last visit. She has started lifting weights and she feels  tighter. She has more tightness afterwards and she feels that she has less muscle mobility than normal. She felt that when she hoisted herself out of the pool she couldn't use her LUE, it felt that she didn't have the strength or mobility.      Functional limitations are described as occurring with:   lifting  performing routine daily activities  Sleeping - she tries to sleep on her back  household activity, moving her neck    Objective:       Treatment Today       Patient Education: Patient was educated on continuing plan of care, progress and review of current HEP. Patient educated on importance of consistency with exercise and therapy, as well as activity modification in order to see change and improvements. Patient demonstrated and verbalized understanding.     Manual Therapy:  STM to bilateral UT, levators and cervical upglides on L to decrease rotation    Exercises:  Exercise #1: supine chin tuck - hold 5 sec x 10  Comment #1: UT/levator stretch - hold 30 sec x 2  Exercise #2: scapular retraction - hold 5 sec   Comment #2: pec doorway stretch - hold 30 sec x 2  Exercise #3: prone LT retraction - hold 5 sec x 10  Comment #3: theraband rows/extension for LT engagment - 10-30 reps green band      TREATMENT MINUTES COMMENTS   Evaluation     Self-care/ Home management     Manual therapy 10 STM to bilateral UT, levators and cervical upglides on L to decrease rotation; theracane   Neuromuscular Re-education     Therapeutic Activity     Therapeutic Exercises 15 See above flowsheet; arm bike   Gait training     Modality__________________                Total 25    Blank areas are intentional and mean the treatment did not include these items.       Skye Tucker, PT  1/4/2018

## 2021-06-16 PROBLEM — M53.9 BACK PROBLEM: Status: ACTIVE | Noted: 2018-05-03

## 2021-06-16 PROBLEM — K57.32 DIVERTICULITIS OF LARGE INTESTINE WITHOUT PERFORATION OR ABSCESS WITHOUT BLEEDING: Status: ACTIVE | Noted: 2018-10-31

## 2021-06-17 NOTE — PATIENT INSTRUCTIONS - HE
"Patient Instructions by Niru Crawley MD at 6/11/2019 12:20 PM     Author: Niru Crawley MD Service: -- Author Type: Physician    Filed: 6/11/2019  1:03 PM Encounter Date: 6/11/2019 Status: Signed    : Niru Crawley MD (Physician)         Patient Education     Tick Bites  Ticks are small arachnids that feed on the blood of rodents, rabbits, birds, deer, dogs, and people. A tick bite may cause a reaction like a spider bite. You may have redness, itching, and slight swelling at the site. Sometimes you may have no reaction where the tick bit you.  Ticks may gorge themselves for days before you find and remove them. The bites themselves aren't cause for concern. But ticks can carry and pass on illnesses such as Lyme disease and [place] spotted fever. Both diseases begin with a rash and symptoms similar to the flu. In advanced stages, these diseases can be quite serious.     A \"bull's eye\" rash is a common symptom of Lyme disease.   When to go to the emergency room (ER)  Not all ticks carry disease. And a tick must stay attached for at least 24 hours to infect you. If you find a tick, don't panic. Try to carefully remove it with tweezers. Grasp the tick near its head and pull without twisting. If you can't easily dislodge the tick or if you leave the head in your skin, get medical care right away.  What to expect in the ER    The tick or any parts of the tick will be removed and the bite will be cleaned.    To prevent disease, you may be given antibiotics. Both Lyme disease and [place] spotted fever respond quickly to these medicines.    You may be asked to see your healthcare provider for a blood test to check for Lyme disease.  Follow-up care  Some states and counties have services that test ticks for Lyme disease and other diseases. Check with your local officials to see if this service is available in your area.  If you remove a tick yourself, watch for signs of a tick-borne illness. Symptoms may " show up within a few days or weeks after a bite. Call your healthcare provider if you notice any of the following:    Rash. The rash may spread outward in a ring from a hard white lump. Or it may move up your arms and legs to your chest.    Chills and fever    Body aches and joint pain    Severe headache  Date Last Reviewed: 12/1/2016 2000-2017 [employer]. [address] [place] [address]. All rights reserved. This information is not intended as a substitute for professional medical care. Always follow your healthcare professional's instructions.           Patient Education     Quadriceps Stretch (Flexibility)    1. Stand up straight and hold onto a wall, sturdy chair, railing, or table with your right hand.  2. Bend your left leg at the knee behind you, and grab your ankle with your left hand. Pull your left heel toward your buttocks. Dont arch your back.  3. Hold for 30 to 60 seconds. Repeat 2 times.  4. Switch legs and repeat.  Date Last Reviewed: 5/1/2016 2000-2017 [employer]. [address] [place] [id number]. All rights reserved. This information is not intended as a substitute for professional medical care. Always follow your healthcare professional's instructions.           Patient Education     Quadriceps, Short Arcs (Strength)    1. Sit on the floor with your right leg straight in front of you. Bend your left knee up and put your left foot flat on the floor.  2. Place a rolled-up towel under your right thigh, just above your knee. Relax your leg.  3. Straighten your right leg, lifting your foot off the floor. Hold for 5 seconds. Then relax.  4. Repeat 10 times, or as instructed.  5. Switch legs and then repeat.     Challenge yourself  Use ankle weights for a tougher workout. Your healthcare provider will tell you what size ankle weights to use.   Date Last Reviewed: 3/10/2016    3824-2806 [employer]. [address] [place] [address]  [address]. All rights reserved. This information is not intended as a substitute for professional medical care. Always follow your healthcare professional's instructions.           Patient Education     Quadriceps, Isometric (Strength)    This exercise is for an injured right knee. Switch sides if the injury is to your left knee.  1. Sit on the floor with your right leg straight in front of you. Bend your left knee up and put your left foot flat on the floor.  2. Flex your right foot and tighten the thigh muscles of your right leg. Press the back of your right knee toward the floor. Dont arch your back or hunch your shoulders.  3. Hold for 5 to 10 seconds. Then relax.  4. Repeat 10 times, or as instructed.  5. Do this exercise 3 times a day, or as instructed.  Date Last Reviewed: 3/10/2016    5833-8022 [employer]. [address] [place] [address], [place] [address]. All rights reserved. This information is not intended as a substitute for professional medical care. Always follow your healthcare professional's instructions.

## 2021-06-17 NOTE — PROGRESS NOTES
ASSESSMENT:   1. Confusion     2. Pelvic pain         PLAN:  37-year-old otherwise healthy female presents for evaluation of vague complaints of pelvic cramping with low back pain as well as 1 day of confusion.  Limited exam here in clinic today does reveal patient to be relatively slow to respond to questioning, however oriented ×4.  Blood pressure today is 98/54, which is not abnormal for this patient.  She is neither febrile or tachycardic.  Given the symptoms of confusion, I feel the patient would be best served in the emergency department today.  I doubt a septic etiology, however I cannot rule this out.  Without the ability to do a rapid workup, give IV fluids or medications, I did encourage the patient to present to the [facility] emergency room.  I contacted the department and spoke with Homer Muhammad, HAMMAD, and updated him with the patient's history.  Patient left the clinic with her  with the intent of presenting to the emergency department.    I discussed red flag symptoms, return precautions to clinic/ER and follow up care with patient/parent.  Expected clinical course, symptomatic cares advised. Questions answered. Patient/parent amenable with plan.    Patient Instructions:  Patient Instructions   Please go immediately to the ED.      SUBJECTIVE:   Britney Jenkins is a 37 y.o. female who presents today with reports of vague suprapubic and low back pain.  This has been intermittent for the past 3 days, more consistent today however notes it is not severe.  This is not been associated with fevers, dysuria, urinary frequency, hematuria, vomiting or diarrhea.  Denies vaginal bleeding.  Denies possibility of pregnancy as she did just have a period.  Notes she has vaginal discharge that is normal for her no change in consistency, amount or odor.  Patient does note she has had some persistent nausea.  The biggest concern is that she and her  have is that the patient was in a nail salon approximately  "2 hours ago and became quite lightheaded so stepped outside.  She then called her  from the parking lot and he notes she could not articulate what was wrong or come up with what to do next.  He notes that she was very confused, and she then sat in her car in the parking lot until he could get to her to pick her up.  She notes the lightheadedness did resolve after several minutes but she continued to feel quite confused.  Notes that she still has a feeling of vague confusion at this time.  Has been notes that she continues to seem \"off\" to him, however not quite as bad as she was earlier.  Denies drug or alcohol use today.  Does note she was feeling quite anxious earlier today while this was happening.    ROS:  Comprehensive 12 pt ROS completed, positives noted in HPI, otherwise negative.      Past Medical History:  Patient Active Problem List   Diagnosis     Acne     Vitamin D Deficiency     Vaginal Discharge     Cramp Of Limb     Decreased Concentrating Ability       Surgical History:  No past surgical history on file.        Family History:  Family History   Problem Relation Age of Onset     Thyroid cancer Mother 45     Cervical cancer Mother 35     Cancer Father 60     LN     Diabetes Maternal Grandmother      Dementia Maternal Grandfather      Dementia Paternal Grandmother        Reviewed; Non-contributory    History   Smoking Status     Former Smoker     Quit date: 2/13/2010   Smokeless Tobacco     Never Used       Current Medications:  Current Outpatient Prescriptions on File Prior to Visit   Medication Sig Dispense Refill     benzoyl peroxide 5 % external liquid Apply twice a day as directed 227 g 0     clindamycin (CLINDAGEL) 1 % gel Apply to affected area 2 times daily 60 g 0     clindamycin-benzoyl peroxide (BENZACLIN) gel APPLY OVER AFFECTED AREA TWICE A DAY 50 g 0     dextroamphetamine-amphetamine (ADDERALL XR) 20 MG 24 hr capsule TAKE ONE CAPSULE BY MOUTH EVERY DAY  0     MULTIVIT " &MINERALS/FERROUS FUM (MULTI VITAMIN ORAL) Take 1 tablet by mouth daily.       norgestimate-ethinyl estradiol (TRI-ESTARYLLA) 0.18/0.215/0.25 mg-35 mcg (28) Tab tablet TAKE 1 TABLET BY MOUTH DAILY. 3 Package 4     tiZANidine (ZANAFLEX) 2 MG tablet Take 1 tablet (2 mg total) by mouth every 6 (six) hours as needed. 30 tablet 0     No current facility-administered medications on file prior to visit.        Allergies:   No Known Allergies    OBJECTIVE:   Vitals:    04/09/18 1643   BP: 98/54   Pulse: 93   Temp: 98.2  F (36.8  C)   TempSrc: Oral   SpO2: 99%   Weight: 129 lb (58.5 kg)     Physical exam reveals a pleasant 37 y.o. female.   Appears healthy, alert, cooperative. Non-toxic appearance.  Eyes:  No conjunctivitis, lids normal.   Respiratory: Respirations unlabored  Skin: pink, warm, dry, and without lesions on limited skin exam. No rashes.  Neuro: Alert and oriented x 4  Psych: Flat affect, slow to answer questions.      RADIOLOGY    none  LABORATORY STUDIES    none      Ginny Mason, AKI

## 2021-06-17 NOTE — PROGRESS NOTES
Assessment/Plan:        Diagnoses and all orders for this visit:    Dyspnea    Rash        Reassurance provided.  Less likely from the Flagyl.  Continue to finish the full course of treatment.  We discussed indications for earlier follow-up or concerning symptoms to note for.  We discussed about possible stress with the recent illness that could also be contributing to her shortness of breath.  For the rash, no concerning changes noted at this time.  Will closely monitor.  Less likely from medication allergies.  She was agreeable with the plans.    25 minutes spent with more than 50% in counseling discussion of treatment plans.  Subjective:    Patient ID: Britney Jenkins is a 37 y.o. female.    HPI    Britney is here with concerns about shortness of breath.  She was seen last week for follow-up from her ER visit for diverticulitis.  On ciprofloxacin and Flagyl.  Since after she started Flagyl, she finds herself having shortness of breath.  Feels as if it is an effort to take a breath or labored.  Not as bad at this time but seems to be constant.  She notes more shortness of breath with exertion.  Occasional lightheadedness that she has to grab onto things and feels tired.  No syncopal events.  No chronic lung disease, smoking.  No hemoptysis.  She continues with the Flagyl.  Denies any choking spells, fever, cough.  No vomiting.    She failed to mention on her visit last week that she had a rash in her right forearm.  She had an IV line on the area.  Noted the rash around 48 hours after she left the ER.  Unable to describe it fully but it was red and bumps.  Unsure if it was hives.  Eventually resolved on its own after a day or 2.    Review of Systems  As above otherwise negative.          Objective:    Physical Exam  /60 (Patient Site: Left Arm, Patient Position: Sitting, Cuff Size: Adult Regular)  Pulse 72  Temp 97.5  F (36.4  C) (Oral)   LMP 03/29/2018  SpO2 100%  Breastfeeding? No    Vital signs noted  above. AAO ×3.  HEENT no nasal discharge, moist oral mucosa. Neck: Supple neck, nonpalpable cervical lymph nodes.  Lungs: Symmetrical chest expansion, no retractions, clear to auscultation bilateral.  Heart: S1-S2 regular rate and rhythm, no murmurs were noted.  Abdomen:  with bowel sounds.  Skin: No concerning rashes noted at this time and her right upper extremity.

## 2021-06-17 NOTE — PROGRESS NOTES
Assessment/Plan:        Diagnoses and all orders for this visit:    Abdominal pain  -     XR Abdomen Flat and Upright    Diverticulitis    Constipation        Discussed findings and diverticulitis in general.  Discussed course and usual to see changes in her stool especially that she continues to be treated.  Advised to consider having a CAT scan of her abdomen again in around 6 weeks time to make sure that there is resolution.  Was concerned and wonders about obstruction.  We did an abdominal x-ray and initial read by me was negative.  Will send that to radiology for final read.  We discussed symptoms to note for with obstruction and complicated diverticulitis.  Discussed indications for ER visit.  To finish the full course of antibiotics.  Continue with healthy food choices, fluids.  Avoid raw foods, greasy foods.  We also directed her to [place] website for more information with diverticulitis as she wonders about these.  She was agreeable with the plans.    40 minutes spent with more than 50% in counseling discussion of treatment plans.  Subjective:    Patient ID: Britney Jenkins is a 37 y.o. female.    MELVIN Tan is here for follow-up from her recent ER visit.  She was having increased abdominal discomfort, cramping, gassy for around 2-4 days.  She also had pelvic discomfort into her pubic area, sharp.  Also noted lower back pain.  She ate out for 2 nights and is not typical for her.  She felt uncomfortable after coming back and stomach discomfort.  Unsure but possibly other family members were not feeling good after eating out with them.  Denies any fever, vomiting, diarrhea.  No blood in her stool.  No history of GI issues in the past.  Unsure if mom with Crohn's.  Great grandmother with diverticulitis.  No changes in her diet.  No recent travel.  No recent antibiotic use.  No new herbal supplements.  Went to ER and diagnosed with diverticulitis on April 9.  Given ciprofloxacin, Flagyl.  The pain is getting  better.  Had pain in her left lower back and left lower abdomen yesterday but now mostly in the left lower abdomen.  It is intermittent.  Less discomfort compared to before.  She has not needed to take pain medication as compared to before.  Initially had nausea with antibiotic and took Zofran for 2 days.  Worries about her bowel movement and possible obstruction.  She is able to as gas although less compared to before.  Has not been able to move for 5 days until this morning.  Initially you were not gets and then small amount.  No blood.  Bowel movements were regular before and concerned about the changes.  She feels that she eats a lot.  Has been trying to eat at home.  Unsure if she had surgery while she was young, rectal prolapse.    Review of Systems  As above otherwise negative.          Objective:    Physical Exam  /70 (Patient Site: Left Arm, Patient Position: Sitting, Cuff Size: Adult Regular)  Pulse 61  Temp 97.7  F (36.5  C) (Oral)   Wt 126 lb 6.4 oz (57.3 kg)  LMP 03/29/2018  SpO2 100%  Breastfeeding? No  BMI 23.88 kg/m2    Vital signs noted above. AAO ×3.  HEENT no nasal discharge, moist oral mucosa. Neck: Supple neck, nonpalpable cervical lymph nodes.  Lungs: Clear to auscultation bilateral.  Heart: S1-S2 regular rate and rhythm, no murmurs were noted.  Abdomen: Flabby, soft with bowel sounds and minimal tenderness in her left lower quadrant with rebound.  She looks comfortable with examination.  Extremities: No edema, pulses were full and equal.

## 2021-06-18 NOTE — LETTER
Letter by Juju Hernandez at      Author: Juju Hernandez Service: -- Author Type: --    Filed:  Encounter Date: 2/13/2019 Status: (Other)       Britney Jenkins  [address] [facility] [address]           02/13/19       Dear Britney:      At [facility], we care about your health and well-being.  Your primary care provider is committed to ensuring you receive high quality care and has chosen a network of specialists to assist in providing that care.  Recently Dr. Crawley referred you to [facility] for specialty care.      It is important to your overall health to follow through with the referral from your care provider.  Please call [facility] [phone] at your earliest convenience for assistance in scheduling an appointment.  If you have already scheduled an appointment, please disregard this notice.  Thank you for choosing the [facility] for your healthcare needs.        Sincerely,        Electronically signed by Juju Hernandez      Referral Coordinator   [facility]

## 2021-06-24 NOTE — TELEPHONE ENCOUNTER
Referral Request  Type of referral: OBGYN  Who s requesting: Patient  Why the request: Pelvic Pain - patient is wanting to get this checked now.  Have you been seen for this request: Yes  Does patient have a preference on a group/provider? [facility]  Okay to leave a detailed message?  Yes     Patient is not wanting to make an appt for this referral. Please place order again if needed.

## 2021-06-24 NOTE — TELEPHONE ENCOUNTER
Radiology is requesting order to be placed into Epic.  Brittany Craig CMA [facility] CMT

## 2021-06-24 NOTE — TELEPHONE ENCOUNTER
RN cannot approve Refill Request    RN can NOT refill this medication med is not covered by policy/route to provider.    Timoteo Ramsay, [facility] Triage/Med Refill 2/21/2019    Requested Prescriptions   Pending Prescriptions Disp Refills     clindamycin (CLINDAGEL) 1 % gel 60 g 0     Sig: APPLY EXTERNALLY TO THE AFFECTED AREA TWICE DAILY    There is no refill protocol information for this order

## 2021-06-24 NOTE — TELEPHONE ENCOUNTER
Sent Olinda at [facility] to reactivate order and call patient to schedule.

## 2021-06-24 NOTE — TELEPHONE ENCOUNTER
Orders being requested: Mammogram - diagnostic or routine?  Reason service is needed/diagnosis: Pain in the LT breast that is common for patient - she is to continue monitoring this with mammograms.  Radiology is questioning if the mammogram is diagnostic or a routine mammogram.  Please advise.  When are orders needed by: ASAP  Where to send Orders: Enter into Epic  Okay to leave detailed message?  Yes

## 2021-06-26 NOTE — PROGRESS NOTES
Progress Notes by Marshall Umaña PA-C at 4/21/2018  3:30 PM     Author: Marshall Uamña PA-C Service: -- Author Type: Physician Assistant    Filed: 4/22/2018  8:10 AM Encounter Date: 4/21/2018 Status: Signed    : Marshall Umaña PA-C (Physician Assistant)         Assessment:       Acute bronchitis  Thrush      Plan:       Nystatin oral swish and swallow per orders  Albuterol inhaler per orders  Antitussives per orders  OTC analgesics discussed  Awaiting results of overnight strep test  Follow-up with PCP if no improvement in 5 days  Discussed signs of worsening symptoms and when to be seen sooner if needed      Patient Instructions         Candida Infection: Thrush  Thrush is a type of fungal infection in the mouth and throat. Thrush does not usually affect healthy adults. It is more common in people with a weakened immune system. It is also more likely if you take antibiotics. Thrush is normally not contagious.  Understanding fungus in the mouth and throat  Your mouth and throat normally contain millions of tiny organisms. These include bacteria and yeasts. Many of these do not cause any problems. In fact, they may help fight disease.  Yeasts are a type of fungus. A type of yeast called Candida normally lives on your skin. It is also found on the membranes of your mouth and throat. Usually, this yeast grows only in small amounts and is harmless. But in some cases, Candida can grow out of control and cause thrush. Thrush is related to other kinds of Candida infections that can grow all over the body. Thrush refers to an infection of only the mouth and throat.  What causes thrush?  Thrush happens when something lets too much Candida grow inside your mouth and throat. Certain things that change the normal balance of organisms in the mouth can lead to thrush. One example is antibiotic medicine. This medicine may kill some of the normal bacteria in your mouth. Candida can then grow freely. People on  antibiotics have an increased risk for thrush.  You have a higher risk for thrush if you:    Wear dentures    Are getting chemotherapy    Are getting radiation therapy    Have diabetes    Have a transplanted organ    Use corticosteroids    Have a weakened immune system, such as from AIDS    Are an older adult  Symptoms of thrush  Some people with thrush do not have any symptoms. Symptoms of thrush can include:    A dry, cottony feeling in your mouth    Loss of taste    Pain while eating or swallowing    White or red patches inside your mouth or on the back of your throat  Diagnosing thrush  Your health care provider will ask about your medical history and your symptoms. He or she will look closely at your mouth and throat. White or red patches will be scraped with a tongue depressor. The sample will be sent to a lab to test. This test can usually confirm thrush.  If you have thrush, you may also have esophageal candidiasis. This is common in people who have HIV. Your health care provider may check for this condition with an upper endoscopy. This is a procedure to look at the esophagus. A tissue sample may be taken to test.  Treatment for thrush  It is important to treat thrush early. Untreated, it may turn into a more serious form of widespread infection. Thrush is usually treated with antifungal medicine. The medicine is put directly in your mouth and throat. You may be given a swish and swallow medicine or an antifungal lozenge.  In some cases, you may need an antifungal pill. This can remove Candida throughout your body. Or you may need medicine through an IV. These treatments depend on how severe your infection is, and what other health conditions you have.  If you are at high risk for thrush, you may need to keep taking oral antifungal medicine. This is to help prevent thrush in the future.  What happens if you dont get treated for thrush?  If untreated, the Candida may spread throughout your body. They may  even enter your bloodstream. This can cause serious problems, such as organ failure and even death. Bloodstream infection may need to be treated with high doses of antifungal medicine through an IV.  Systemic infection is much more likely in people who are very ill. It is also more common in those who have serious problems with their immune system. Additional risk factors for systemic infection in very ill people include:    Central venous lines    IV nutrition    Broad-spectrum antibiotics    Kidney failure    Recent surgery  Preventing thrush  You may be able to help prevent some cases of thrush. Make sure to:    Practice good oral hygiene. Try using a chlorhexidine mouthwash.    Clean your dentures regularly as instructed. Make sure they fit you correctly.    After using a corticosteroid inhaler, rinse out your mouth with water or mouthwash.    Do not use broad-spectrum antibiotics, if possible.    Get treated for health problems that increase your risk for thrush, such as diabetes.     When to call the health care provider  Call your health care provider right away if you have any of these:    Cottony feeling in your mouth    Loss of taste    Pain while eating or swallowing    White or red patches inside your mouth or on the back of your throat   Date Last Reviewed: 3/19/2015    9377-2084 [employer]. [address] [place] [address]. All rights reserved. This information is not intended as a substitute for professional medical care. Always follow your healthcare professional's instructions.    You were seen today for acute bronchitis. This is likely due to a viral illness.    Symptom management:  - Get plenty of rest  - Avoid smoking and second hand smoke  - May take tylenol or ibuprofen for fever/discomfort  - Drink plenty of non-caffeinated fluids  - Albuterol inhaler may be used every 6 hours as needed for chest tightness  - May use tessalon perles to help suppress the cough    Reasons  to be seen in the emergency room:  - Develop a fever of 100.4 or higher  - Cough changes, coughing up blood, or become short of breath  - Neck stiffness  - Chest pain  - Severe headache  - Unable to tolerate eating or drinking fluids    Otherwise, if no symptom improvement after 5 days, follow-up with your primary care provider.            Subjective:        History was provided by the patient and .  Britney Jenkins is a 37 y.o. female who presents for evaluation of a sore throat. Associated symptoms include chest tightness, shortness of breath, post-nasal drip, and white spots on tongue. Onset of symptoms was 2 weeks ago, gradually worsening since that time. At the time of onset patient was seen for abdominal pain, diagnosed with diverticulitis, and treated with a 10 day course of ciprofloxacin and flagyl. Abdominal pain has since improved. She is drinking moderate amounts of fluids. She has not had recent close exposure to someone with proven streptococcal pharyngitis.    The following portions of the patient's history were reviewed and updated as appropriate: allergies, current medications and problem list.    Review of Systems  Pertinent items are noted in HPI.    Allergies  No Known Allergies      Objective:       /74  Pulse 76  Temp 97  F (36.1  C) (Oral)   Resp 15  Wt 127 lb (57.6 kg)  LMP 03/29/2018  SpO2 100%  Breastfeeding? No  BMI 24 kg/m2  General appearance: alert, appears stated age, cooperative, no distress and non-toxic  Head: Normocephalic, without obvious abnormality, atraumatic, sinuses nontender to percussion  Ears: normal TM's and external ear canals both ears  Nose: no discharge  Throat: posterior oropharynx erythematous, no tonsil swelling, tonsil stone located on right tonsil, no exudate; MMM, lips and gums normal, white plaques on posterior tongue, unble to scrape off with tongue depressor  Neck: mild anterior cervical adenopathy and supple, symmetrical, trachea  midline  Lungs: clear to auscultation bilaterally and no rhonchi, rales, or wheezing  Heart: regular rate and rhythm, S1, S2 normal, no murmur, click, rub or gallop    Lab Results    Recent Results (from the past 24 hour(s))   Rapid Strep A Screen-Throat   Result Value Ref Range    Rapid Strep A Antigen No Group A Strep detected, presumptive negative No Group A Strep detected, presumptive negative     I personally reviewed these results and discussed findings with the patient.

## 2021-06-26 NOTE — PROGRESS NOTES
Progress Notes by Niru Crawley MD at 10/31/2018  1:20 PM     Author: Niru Crawley MD Service: -- Author Type: Physician    Filed: 10/31/2018  3:09 PM Encounter Date: 10/31/2018 Status: Signed    : Niru Crawley MD (Physician)       FEMALE PREVENTATIVE EXAM    Assessment and Plan:       Britney was seen today for establish care, annual exam and follow-up.    Encounter for routine history and physical exam for male    Screening for cervical cancer    Screening for metabolic disorder  -     Glycosylated Hemoglobin A1c  -     Lipid Cascade    Acne  -     benzoyl peroxide 5 % external liquid; Apply twice a day as directed    Diverticulitis of colon  Comments:  was seen at the ER may was taking cipro/flagyl for 4 wk with no improvement then changed to augmentin with good symptom improvment     Pelvic pain in female  -     US Pelvis Non OB; Future    Intramural and submucous leiomyoma of uterus  -     US Pelvis Non OB; Future    Chronic fatigue  -     Thyroid Stimulating Hormone (TSH)    Other orders  -     biotin 300 mcg Tab; Take 1 tablet by mouth daily.    Order pelvic ultrasound to further evaluate the fibroid and ovarian cyst, if significant findings will refer her to OB/GYN also doing a basic lab work to figure out  Cause for her chronic fatigue  Advised on extra fiber in her diet to prevent diverticulosis/diverticulitis    Next follow up:  1 yr for annual physical    Immunization Review    There are no preventive care reminders to display for this patient.    Immunization History   Administered Date(s) Administered   ? Tdap 10/10/2010       Adult Imm Review: Declines immunizations today      I discussed the following with the patient:   Adult Healthy Living: Importance of regular exercise  Healthy nutrition  Getting adequate sleep  Stress management  Supplement use  Herbal medications/alternative medical therapies    The following low BMI interventions were performed this visit: dietary management education,  guidance, and counseling    I have had an Advance Directives discussion with the patient.    Subjective:   Chief Complaint: Britney Jenkins is an 38 y.o. female here for a preventative health visit.     HPI:  CC chronic pelvic pain and fatigue going on for almost 6 months.  Patient had a history of diverticulitis in May had symptoms for almost 2 months before she got right antibiotic.  At this time CT scan was done which showed diverticulosis, fibroids and ovarian cyst patient is not aware of the last 2 diagnoses.  Continue to feel pain at the left lower quadrant denies any fever chills shortness of breath.  Does have symptoms of alternating diarrhea or constipation so could have some symptoms of IBS.  A lot of stress in her life right now working 2 jobs.    Cycles: Quite regular but very heavy sometimes she feels the tampon just swim out.  .  Patient's last menstrual period was 10/22/2018 (approximate).       Healthy Habits  Are you taking a daily aspirin? No  Do you typically exercising at least 40 min, 3-4 times per week?  Yes  Do you usually eat at least 4 servings of fruit and vegetables a day, include whole grains and fiber and avoid regularly eating high fat foods? Yes  Have you had an eye exam in the past two years? Yes  Do you see a dentist twice per year? NO  Once a year   Do you have any concerns regarding sleep? No    Safety Screen  If you own firearms, are they secured in a locked gun cabinet or with trigger locks? The patient does not own any firearms    Do you feel you are safe where you are living?: Yes (10/31/2018  1:38 PM)  Do you feel you are safe in your relationship(s)?: Yes (10/31/2018  1:38 PM)    Review of Systems:  Please see above.  The rest of the review of systems are negative for all systems.     Pap History:   Yes - updated in Problem List and Health Maintenance accordingly    Cancer Screening       Status Date      PAP SMEAR Next Due 2/16/2021      Done 2/16/2016 GYNECOLOGIC CYTOLOGY  "(PAP SMEAR)     Patient has more history with this topic...          Patient Care Team:  Niru Crawley MD as PCP - General (Family Medicine)        History     Reviewed By Date/Time Sections Reviewed    Mercedes Gallegos CMA 10/31/2018  2:28 PM Social Documentation    Mercedes Gallegos [facility] 10/31/2018  1:38 PM Tobacco            Objective:   Vital Signs:   Visit Vitals   ? /64 (Patient Site: Left Arm, Patient Position: Sitting, Cuff Size: Adult Small)   ? Pulse 74   ? Ht 5' 1.5\" (1.562 m)   ? Wt 120 lb 11.2 oz (54.7 kg)   ? LMP 10/22/2018 (Approximate)   ? Breastfeeding No   ? BMI 22.44 kg/m2        PHYSICAL EXAM  Physical Exam:  General Appearance:  Appears comfortable, Alert, cooperative, no distress,   Head: Normocephalic, without obvious abnormality, atraumatic  Eyes: PERRL, conjunctiva/corneas clear, EOM's intact, both eyes             Nose: Nares normal, no drainage   Throat: Lips, mucosa, and tongue normal; teeth and gums normal  Neck: Supple, symmetrical, trachea midline, no adenopathy;                      Lungs: Clear to auscultation bilaterally, respirations unlabored  Chest Wall: No tenderness or deformity  Heart: Regular rate and rhythm, S1 and S2 normal, no murmur, rubs or gallop  Abdomen: Soft, mild left lower quadrant tenderness.  Also her tenderness on the right side and suprapubic area.  Extremities: Extremities normal, atraumatic, no cyanosis or edema  Pulses: DP pulses are 1-2+ bilat.    Skin: no rashes or lesions  Neurologic: normal and equal strength bilat in upper and lower extremities  Patient refused pelvic exam today               Medication List          These changes are accurate as of 10/31/18  3:04 PM.  If you have any questions, ask your nurse or doctor.               CHANGE how you take these medications          biotin 300 mcg Tab   INSTRUCTIONS:  Take 1 tablet by mouth daily.   What changed:    - how much to take  - when to take this   Changed by:  Niru Crawley MD           "   CONTINUE taking these medications          benzoyl peroxide 5 % external liquid   INSTRUCTIONS:  Apply twice a day as directed           cholecalciferol (vitamin D3) 2,000 unit Tab   INSTRUCTIONS:  Take by mouth.           clindamycin 1 % gel   Also known as:  CLINDAGEL   INSTRUCTIONS:  Apply to affected area 2 times daily   Reason for med:  KNOWN/SUSPECTED INFECTION           dextroamphetamine-amphetamine 20 MG 24 hr capsule   Also known as:  ADDERALL XR   INSTRUCTIONS:  TAKE ONE CAPSULE BY MOUTH EVERY DAY           Lactobacillus acidophilus 10 billion cell capsule   Also known as:  BACID   INSTRUCTIONS:  Take by mouth.           MULTI VITAMIN ORAL   INSTRUCTIONS:  Take 1 tablet by mouth daily.           polyethylene glycol 17 gram packet   Also known as:  MIRALAX   INSTRUCTIONS:  Take 1 packet by mouth.   Reason for med:  constipation             STOP taking these medications          albuterol 90 mcg/actuation inhaler   Also known as:  PROAIR HFA;PROVENTIL HFA;VENTOLIN HFA   Stopped by:  Niru Crawley MD       clindamycin-benzoyl peroxide gel   Also known as:  BENZACLIN   Stopped by:  Niru Crawley MD            Where to Get Your Medications      These medications were sent to [facility] [address] [place] [address] [address] AT [place] [address] [address], [facility] [id number]    Hours:  24-hours Phone:  [phone]    ? benzoyl peroxide 5 % external liquid         You can get these medications from any pharmacy     You don't need a prescription for these medications    ? biotin 300 mcg Tab             Additional Screenings Completed Today:     Patient Instructions     Uterine Fibroids    Fibroids are lumps (growths) of muscle tissue. They can form in the wall of uterus (womb). Fibroids are very common. They are almost always benign (noncancerous). It is not known what causes fibroids. But they may run in families. Also, changes in female hormones may cause them to grow over time.  After menopause, fibroids may stop growing, shrink, or go away.  Fibroids may or may not cause symptoms. This depends on many factors, such as the size, number, and locations of the fibroids. If symptoms do occur, they can include:    Heavy bleeding (in severe cases, this may lead to a problem called anemia) or painful periods    Feeling of fullness, swelling, pressure, or pain in the lower belly (abdomen) or pelvic region    Lower back pain    Frequent urination    Constipation    Pain during sex    Problems getting pregnant  If fibroids are suspected, an evaluation will be done to help understand the extent of the problem. This can include a health history, exam, and tests. Based on the results, treatment can then be planned if needed.  Until more details are known about your problem, you may be given guidelines similar to the home care instructions below.      Home care    To help control pain, over-the-counter pain medicine may be advised. Take these only as directed by your provider.    If you have heavy or painful periods, keep a log of your menstrual cycle. Show the log to your provider. The information may help him or her determine if your symptoms are due to fibroids or another cause.    Make certain lifestyle changes. This may include losing excess weight, being more active, or eating less red meat. These changes may help lower the risk of fibroids in some people. They may also help improve overall health.  Follow-up care  Follow up with your healthcare provider as advised. If testing was done, youll be told the results when they are ready. Treatment options for fibroids may include medicines or procedures to help shrink or keep fibroids from growing. In severe cases, surgery may be needed to remove fibroids or to remove the uterus. If you have fibroids but no symptoms, you may not need treatment at all. However, your provider may advise regular follow-up to check fibroid size and growth.  When to seek  medical advice  Call your healthcare provider right away if any of these occur:    Heavy bleeding or painful periods that continue or dont get better with treatment    Signs of anemia such as extreme fatigue, pale skin, shortness of breath with little exertion, or rapid heartbeat    Weakness, dizziness, or fainting    Severe pain in the pelvic or belly region    Swollen or enlarged belly  Date Last Reviewed: 10/1/2017    5734-1054 [employer]. [address] [place], [place] [id number]. All rights reserved. This information is not intended as a substitute for professional medical care. Always follow your healthcare professional's instructions.

## 2021-07-03 NOTE — ADDENDUM NOTE
Addendum Note by Zena Shane MD at 4/13/2018 12:27 PM     Author: Zena Shane MD Service: -- Author Type: Physician    Filed: 4/13/2018 12:27 PM Encounter Date: 4/13/2018 Status: Signed    : Zena Shane MD (Physician)    Addended by: ZENA SHANE on: 4/13/2018 12:27 PM        Modules accepted: Orders

## 2021-12-21 ENCOUNTER — HOSPITAL ENCOUNTER (OUTPATIENT)
Facility: AMBULATORY SURGERY CENTER | Age: 41
End: 2021-12-21
Attending: OBSTETRICS & GYNECOLOGY
Payer: COMMERCIAL

## 2021-12-21 DIAGNOSIS — Z11.59 ENCOUNTER FOR SCREENING FOR OTHER VIRAL DISEASES: ICD-10-CM

## 2022-01-10 ENCOUNTER — OFFICE VISIT (OUTPATIENT)
Dept: FAMILY MEDICINE | Facility: CLINIC | Age: 42
End: 2022-01-10
Payer: COMMERCIAL

## 2022-01-10 ENCOUNTER — LAB (OUTPATIENT)
Dept: LAB | Facility: CLINIC | Age: 42
End: 2022-01-10
Attending: OBSTETRICS & GYNECOLOGY
Payer: COMMERCIAL

## 2022-01-10 VITALS
OXYGEN SATURATION: 100 % | SYSTOLIC BLOOD PRESSURE: 96 MMHG | HEIGHT: 62 IN | WEIGHT: 128 LBS | BODY MASS INDEX: 23.55 KG/M2 | DIASTOLIC BLOOD PRESSURE: 62 MMHG

## 2022-01-10 DIAGNOSIS — N92.0 MENORRHAGIA WITH REGULAR CYCLE: ICD-10-CM

## 2022-01-10 DIAGNOSIS — F90.9 ATTENTION DEFICIT HYPERACTIVITY DISORDER (ADHD), UNSPECIFIED ADHD TYPE: ICD-10-CM

## 2022-01-10 DIAGNOSIS — Z11.59 ENCOUNTER FOR SCREENING FOR OTHER VIRAL DISEASES: ICD-10-CM

## 2022-01-10 DIAGNOSIS — Z01.818 PREOP GENERAL PHYSICAL EXAM: Primary | ICD-10-CM

## 2022-01-10 LAB
HCG UR QL: NEGATIVE
HGB BLD-MCNC: 12.9 G/DL (ref 11.7–15.7)
POTASSIUM BLD-SCNC: 4.4 MMOL/L (ref 3.5–5)

## 2022-01-10 PROCEDURE — 36415 COLL VENOUS BLD VENIPUNCTURE: CPT | Performed by: FAMILY MEDICINE

## 2022-01-10 PROCEDURE — U0005 INFEC AGEN DETEC AMPLI PROBE: HCPCS

## 2022-01-10 PROCEDURE — 85018 HEMOGLOBIN: CPT | Performed by: FAMILY MEDICINE

## 2022-01-10 PROCEDURE — 84132 ASSAY OF SERUM POTASSIUM: CPT | Performed by: FAMILY MEDICINE

## 2022-01-10 PROCEDURE — U0003 INFECTIOUS AGENT DETECTION BY NUCLEIC ACID (DNA OR RNA); SEVERE ACUTE RESPIRATORY SYNDROME CORONAVIRUS 2 (SARS-COV-2) (CORONAVIRUS DISEASE [COVID-19]), AMPLIFIED PROBE TECHNIQUE, MAKING USE OF HIGH THROUGHPUT TECHNOLOGIES AS DESCRIBED BY CMS-2020-01-R: HCPCS

## 2022-01-10 PROCEDURE — 99214 OFFICE O/P EST MOD 30 MIN: CPT | Performed by: FAMILY MEDICINE

## 2022-01-10 PROCEDURE — 81025 URINE PREGNANCY TEST: CPT | Performed by: FAMILY MEDICINE

## 2022-01-10 ASSESSMENT — MIFFLIN-ST. JEOR: SCORE: 1194.88

## 2022-01-10 NOTE — PROGRESS NOTES
[facility]  [address] [address]  Phone: [phone]  Fax: [phone]  Primary Provider: Niru Crawley  Pre-op Performing Provider: MISSY RSOAS      PREOPERATIVE EVALUATION:  Today's date: 1/10/2022    Britney Jenkins is a 41 year old female who presents for a preoperative evaluation.    Surgical Information:  Surgery/Procedure: HYSTEROSCOPY, WITH DILATION AND CURETTAGE , POSSIBLE ENDOMETRIAL POLYPECTOMY, POSSIBLE INTRAUTERINE DEVICE PLACEMENT  Surgery Location: [facility]  Surgeon: Dr. Saira Adams  Surgery Date: 1/13/2022  Time of Surgery: 7:45 am   Where patient plans to recover: At home with family  Fax number for surgical facility: Note does not need to be faxed, will be available electronically in Epic.    Type of Anesthesia Anticipated: to be determined    Assessment & Plan     The proposed surgical procedure is considered INTERMEDIATE risk.    Preop general physical exam, Menorrhagia with regular cycle  - Hemoglobin  - HCG qualitative urine  - Potassium    Risks and Recommendations:  The patient has the following additional risks and recommendations for perioperative complications:   - No identified additional risk factors other than previously addressed    RECOMMENDATION:  APPROVAL GIVEN to proceed with proposed procedure, without further diagnostic evaluation.    Attention deficit hyperactivity disorder (ADHD), unspecified ADHD type  Hold Adderall in preparation for surgery    HRT  She reports taking hormone replacement therapy given to her by her gynecologist  She is unable to recall the name of the medications except that she is no longer taking testosterone  I asked that she call us with the name of her medications       Subjective     HPI related to upcoming procedure:    Preop Questions 1/10/2022   1. Have you ever had a heart attack or stroke? No   2. Have you ever had surgery on your heart or blood vessels, such  as a stent placement, a coronary artery bypass, or surgery on an artery in your head, neck, heart, or legs? No   3. Do you have chest pain with activity? No   4. Do you have a history of  heart failure? No   5. Do you currently have a cold, bronchitis or symptoms of other infection? No   6. Do you have a cough, shortness of breath, or wheezing? No   7. Do you or anyone in your family have previous history of blood clots? UNKNOWN    8. Do you or does anyone in your family have a serious bleeding problem such as prolonged bleeding following surgeries or cuts? UNKNOWN   9. Have you ever had problems with anemia or been told to take iron pills? YES    10. Have you had any abnormal blood loss such as black, tarry or bloody stools, or abnormal vaginal bleeding? YES   11. Have you ever had a blood transfusion? No   12. Are you willing to have a blood transfusion if it is medically needed before, during, or after your surgery? Yes   13. Have you or any of your relatives ever had problems with anesthesia? UNKNOWN    14. Do you have sleep apnea, excessive snoring or daytime drowsiness? No   15. Do you have any artifical heart valves or other implanted medical devices like a pacemaker, defibrillator, or continuous glucose monitor? No   16. Do you have artificial joints? No   17. Are you allergic to latex? No   18. Is there any chance that you may be pregnant? No     Preoperative Review of :   reviewed - controlled substances reflected in medication list.      Status of Chronic Conditions:  See problem list for active medical problems.  Problems all longstanding and stable, except as noted/documented.  See ROS for pertinent symptoms related to these conditions.      Review of Systems  CONSTITUTIONAL: NEGATIVE for fever, chills, change in weight  INTEGUMENTARY/SKIN: NEGATIVE for worrisome rashes, moles or lesions  EYES: NEGATIVE for vision changes or irritation  ENT/MOUTH: NEGATIVE for ear, mouth and throat problems  RESP:  NEGATIVE for significant cough or SOB  CV: NEGATIVE for chest pain, palpitations or peripheral edema  GI: NEGATIVE for nausea, abdominal pain, heartburn, or change in bowel habits  : NEGATIVE for frequency, dysuria, or hematuria  MUSCULOSKELETAL: NEGATIVE for significant arthralgias or myalgia  NEURO: NEGATIVE for weakness, dizziness or paresthesias  ENDOCRINE: NEGATIVE for temperature intolerance, skin/hair changes  HEME: NEGATIVE for bleeding problems  PSYCHIATRIC: NEGATIVE for changes in mood or affect    Patient Active Problem List    Diagnosis Date Noted     Diverticulitis of large intestine without perforation or abscess without bleeding 10/31/2018     Priority: Medium     Back problem 05/03/2018     Priority: Medium     Vitamin D Deficiency      Priority: Medium     Created by Conversion  Replacement Utility updated for latest IMO load         Vaginal Discharge      Priority: Medium     Created by Conversion  Replacement Utility updated for latest IMO load         Cramp Of Limb      Priority: Medium     Created by Conversion         Decreased Concentrating Ability      Priority: Medium     Created by Conversion         Acne      Priority: Medium     Created by Conversion          No past medical history on file.  No past surgical history on file.  Current Outpatient Medications   Medication Sig Dispense Refill     biotin 300 mcg Tab [BIOTIN 300 MCG TAB] Take 1 tablet by mouth daily. 90 each 1     cholecalciferol, vitamin D3, 2,000 unit Tab [CHOLECALCIFEROL, VITAMIN D3, 2,000 UNIT TAB] Take by mouth.       dextroamphetamine-amphetamine (ADDERALL XR) 20 MG 24 hr capsule [DEXTROAMPHETAMINE-AMPHETAMINE (ADDERALL XR) 20 MG 24 HR CAPSULE] TAKE ONE CAPSULE BY MOUTH EVERY DAY  0     MULTIVIT &MINERALS/FERROUS FUM (MULTI VITAMIN ORAL) [MULTIVIT &MINERALS/FERROUS FUM (MULTI VITAMIN ORAL)] Take 1 tablet by mouth daily.       benzoyl peroxide 5 % external liquid [BENZOYL PEROXIDE 5 % EXTERNAL LIQUID] Apply twice a day  "as directed (Patient not taking: Reported on 1/10/2022) 227 g 0     clindamycin (CLINDAGEL) 1 % gel [CLINDAMYCIN (CLINDAGEL) 1 % GEL] APPLY EXTERNALLY TO THE AFFECTED AREA TWICE DAILY (Patient not taking: Reported on 1/10/2022) 60 g 0     psyllium husk (METAMUCIL) 0.4 gram cap [PSYLLIUM HUSK (METAMUCIL) 0.4 GRAM CAP] Take by mouth. Metamucil Tablets (Patient not taking: Reported on 1/10/2022)         No Known Allergies     Social History     Tobacco Use     Smoking status: Never Smoker     Smokeless tobacco: Never Used   Substance Use Topics     Alcohol use: Yes     Comment: Alcoholic Drinks/day: rare     Family History   Problem Relation Age of Onset     Cervical Cancer Mother 35.00     Thyroid Disease Mother 45.00     Cancer Father 60.00        LN     Diabetes Maternal Grandmother      Dementia Maternal Grandfather      Dementia Paternal Grandmother      History   Drug Use No         Objective     BP 96/62 (BP Location: Left arm, Patient Position: Sitting, Cuff Size: Adult Regular)   Ht 1.568 m (5' 1.75\")   Wt 58.1 kg (128 lb)   LMP 12/27/2021 (Exact Date)   SpO2 100%   Breastfeeding No   BMI 23.60 kg/m      Physical Exam    GENERAL APPEARANCE: healthy, alert and no distress     EYES: EOMI, PERRL     HENT: ear canals and TM's normal and nose and mouth without ulcers or lesions     NECK: no adenopathy, no asymmetry, masses, or scars and thyroid normal to palpation     RESP: lungs clear to auscultation - no rales, rhonchi or wheezes     CV: regular rates and rhythm, normal S1 S2, no S3 or S4 and no murmur, click or rub     ABDOMEN:  soft, nontender, no HSM or masses and bowel sounds normal     MS: extremities normal- no gross deformities noted, no evidence of inflammation in joints, FROM in all extremities.     SKIN: no suspicious lesions or rashes     NEURO: Normal strength and tone, sensory exam grossly normal, mentation intact and speech normal     PSYCH: mentation appears normal. and affect normal/bright   "   LYMPHATICS: No cervical adenopathy    No results for input(s): HGB, PLT, INR, NA, POTASSIUM, CR, A1C in the last [id number] hours.     Diagnostics:  Labs pending at this time.  Results will be reviewed when available.   No EKG required, no history of coronary heart disease, significant arrhythmia, peripheral arterial disease or other structural heart disease.    Revised Cardiac Risk Index (RCRI):  The patient has the following serious cardiovascular risks for perioperative complications:   - No serious cardiac risks = 0 points     RCRI Interpretation: 0 points: Class I (very low risk - 0.4% complication rate)           Signed Electronically by: Champ Jurado MD  Copy of this evaluation report is provided to requesting physician.

## 2022-01-11 LAB — SARS-COV-2 RNA RESP QL NAA+PROBE: NEGATIVE

## 2022-01-12 VITALS — BODY MASS INDEX: 23.55 KG/M2 | WEIGHT: 128 LBS | HEIGHT: 62 IN

## 2022-01-12 RX ORDER — LIDOCAINE 40 MG/G
CREAM TOPICAL
Status: CANCELLED | OUTPATIENT
Start: 2022-01-12

## 2022-01-12 RX ORDER — ACETAMINOPHEN 325 MG/1
975 TABLET ORAL ONCE
Status: CANCELLED | OUTPATIENT
Start: 2022-01-12 | End: 2022-01-12

## 2022-01-12 RX ORDER — SODIUM CHLORIDE, SODIUM LACTATE, POTASSIUM CHLORIDE, CALCIUM CHLORIDE 600; 310; 30; 20 MG/100ML; MG/100ML; MG/100ML; MG/100ML
INJECTION, SOLUTION INTRAVENOUS CONTINUOUS
Status: CANCELLED | OUTPATIENT
Start: 2022-01-12

## 2022-01-12 ASSESSMENT — MIFFLIN-ST. JEOR: SCORE: 1190.91

## 2022-01-21 ENCOUNTER — HOSPITAL ENCOUNTER (OUTPATIENT)
Facility: AMBULATORY SURGERY CENTER | Age: 42
End: 2022-01-21
Attending: OBSTETRICS & GYNECOLOGY
Payer: COMMERCIAL

## 2022-01-21 DIAGNOSIS — Z11.59 ENCOUNTER FOR SCREENING FOR OTHER VIRAL DISEASES: Primary | ICD-10-CM

## 2022-03-16 DIAGNOSIS — Z11.59 ENCOUNTER FOR SCREENING FOR OTHER VIRAL DISEASES: Primary | ICD-10-CM

## 2022-04-11 ENCOUNTER — OFFICE VISIT (OUTPATIENT)
Dept: FAMILY MEDICINE | Facility: CLINIC | Age: 42
End: 2022-04-11
Payer: COMMERCIAL

## 2022-04-11 ENCOUNTER — LAB (OUTPATIENT)
Dept: LAB | Facility: CLINIC | Age: 42
End: 2022-04-11
Attending: OBSTETRICS & GYNECOLOGY

## 2022-04-11 VITALS
HEART RATE: 89 BPM | SYSTOLIC BLOOD PRESSURE: 112 MMHG | OXYGEN SATURATION: 100 % | HEIGHT: 62 IN | DIASTOLIC BLOOD PRESSURE: 60 MMHG | BODY MASS INDEX: 24.05 KG/M2 | WEIGHT: 130.7 LBS

## 2022-04-11 DIAGNOSIS — Z01.818 PREOP EXAMINATION: Primary | ICD-10-CM

## 2022-04-11 DIAGNOSIS — Z11.59 ENCOUNTER FOR SCREENING FOR OTHER VIRAL DISEASES: ICD-10-CM

## 2022-04-11 DIAGNOSIS — N93.9 ABNORMAL UTERINE BLEEDING: ICD-10-CM

## 2022-04-11 PROBLEM — M53.9 BACK PROBLEM: Status: RESOLVED | Noted: 2018-05-03 | Resolved: 2022-04-11

## 2022-04-11 PROBLEM — R93.89 ENDOMETRIAL THICKENING ON ULTRASOUND: Status: ACTIVE | Noted: 2022-04-11

## 2022-04-11 PROBLEM — G93.32 CHRONIC FATIGUE SYNDROME: Status: ACTIVE | Noted: 2022-04-11

## 2022-04-11 PROBLEM — K57.32 DIVERTICULITIS OF LARGE INTESTINE WITHOUT PERFORATION OR ABSCESS WITHOUT BLEEDING: Status: RESOLVED | Noted: 2018-10-31 | Resolved: 2022-04-11

## 2022-04-11 LAB
ANION GAP SERPL CALCULATED.3IONS-SCNC: 10 MMOL/L (ref 5–18)
BUN SERPL-MCNC: 11 MG/DL (ref 8–22)
CALCIUM SERPL-MCNC: 10 MG/DL (ref 8.5–10.5)
CHLORIDE BLD-SCNC: 104 MMOL/L (ref 98–107)
CO2 SERPL-SCNC: 25 MMOL/L (ref 22–31)
CREAT SERPL-MCNC: 0.84 MG/DL (ref 0.6–1.1)
GFR SERPL CREATININE-BSD FRML MDRD: 89 ML/MIN/1.73M2
GLUCOSE BLD-MCNC: 81 MG/DL (ref 70–125)
HGB BLD-MCNC: 13 G/DL (ref 11.7–15.7)
POTASSIUM BLD-SCNC: 4.3 MMOL/L (ref 3.5–5)
SODIUM SERPL-SCNC: 139 MMOL/L (ref 136–145)

## 2022-04-11 PROCEDURE — 85018 HEMOGLOBIN: CPT | Performed by: FAMILY MEDICINE

## 2022-04-11 PROCEDURE — U0005 INFEC AGEN DETEC AMPLI PROBE: HCPCS

## 2022-04-11 PROCEDURE — 99214 OFFICE O/P EST MOD 30 MIN: CPT | Performed by: FAMILY MEDICINE

## 2022-04-11 PROCEDURE — 80048 BASIC METABOLIC PNL TOTAL CA: CPT | Performed by: FAMILY MEDICINE

## 2022-04-11 PROCEDURE — U0003 INFECTIOUS AGENT DETECTION BY NUCLEIC ACID (DNA OR RNA); SEVERE ACUTE RESPIRATORY SYNDROME CORONAVIRUS 2 (SARS-COV-2) (CORONAVIRUS DISEASE [COVID-19]), AMPLIFIED PROBE TECHNIQUE, MAKING USE OF HIGH THROUGHPUT TECHNOLOGIES AS DESCRIBED BY CMS-2020-01-R: HCPCS

## 2022-04-11 PROCEDURE — 36415 COLL VENOUS BLD VENIPUNCTURE: CPT | Performed by: FAMILY MEDICINE

## 2022-04-11 RX ORDER — OMEGA-3S/DHA/EPA/FISH OIL/D3 300MG-1000
1200 CAPSULE ORAL DAILY
COMMUNITY

## 2022-04-11 RX ORDER — PROGESTERONE 200 MG/1
200 CAPSULE ORAL AT BEDTIME
COMMUNITY
Start: 2022-03-16

## 2022-04-11 RX ORDER — SPIRONOLACTONE 100 MG/1
100 TABLET, FILM COATED ORAL AT BEDTIME
COMMUNITY
Start: 2022-03-17

## 2022-04-11 NOTE — H&P (VIEW-ONLY)
[facility]  [address], [place] [address]  Phone: [phone]  Fax: [phone]  Primary Provider: Niru Crawley  Pre-op Performing Provider: WALTER JOSEPH      PREOPERATIVE EVALUATION:  Today's date: 4/11/2022    Britney Jenkins is a 41 year old female who presents for a preoperative evaluation.    Surgical Information:  Surgery/Procedure: D&C  Surgery Location: [facility]  Surgeon: Dr. Adams  :Surgery Date: 4/14/2022  Time of Surgery: 7:30AM  Where patient plans to recover: At home with family  Fax number for surgical facility: Note does not need to be faxed, will be available electronically in Epic.    Type of Anesthesia Anticipated: Choice    Assessment & Plan     The proposed surgical procedure is considered INTERMEDIATE risk.    Preop examination    - Hemoglobin; Future  - Basic metabolic panel; Future  - Hemoglobin  - Basic metabolic panel    Abnormal uterine bleeding        Risks and Recommendations:  The patient has the following additional risks and recommendations for perioperative complications:   - No identified additional risk factors other than previously addressed    Medication Instructions:  Patient is to take all scheduled medications on the day of surgery    RECOMMENDATION:  APPROVAL GIVEN to proceed with proposed procedure, without further diagnostic evaluation.    Review of external notes as documented above   Review of the result(s) of each unique test - labs                  Subjective     HPI related to upcoming procedure: Patient is having preoperative consultation today.  She is having a hysteroscopy and possible polyp removal for some dysfunctional uterine bleeding.  She has actually already had this preop done a few months ago so this is just an update to that exam.      Preop Questions 1/10/2022   1. Have you ever had a heart attack or stroke? No   2. Have you ever had surgery on your heart or blood vessels, such as a stent  placement, a coronary artery bypass, or surgery on an artery in your head, neck, heart, or legs? No   3. Do you have chest pain with activity? No   4. Do you have a history of  heart failure? No   5. Do you currently have a cold, bronchitis or symptoms of other infection? No   6. Do you have a cough, shortness of breath, or wheezing? No   7. Do you or anyone in your family have previous history of blood clots? UNKNOWN -    8. Do you or does anyone in your family have a serious bleeding problem such as prolonged bleeding following surgeries or cuts? UNKNOWN -    9. Have you ever had problems with anemia or been told to take iron pills? YES -    10. Have you had any abnormal blood loss such as black, tarry or bloody stools, or abnormal vaginal bleeding? YES -    11. Have you ever had a blood transfusion? No   12. Are you willing to have a blood transfusion if it is medically needed before, during, or after your surgery? Yes   13. Have you or any of your relatives ever had problems with anesthesia? UNKNOWN -    14. Do you have sleep apnea, excessive snoring or daytime drowsiness? No   15. Do you have any artifical heart valves or other implanted medical devices like a pacemaker, defibrillator, or continuous glucose monitor? No   16. Do you have artificial joints? No   17. Are you allergic to latex? No   18. Is there any chance that you may be pregnant? No     Health Care Directive:  Patient does not have a Health Care Directive or Living Will: Discussed advance care planning with patient; however, patient declined at this time.    Preoperative Review of :   reviewed - controlled substances reflected in medication list.      Status of Chronic Conditions:  See problem list for active medical problems.  Problems all longstanding and stable, except as noted/documented.  See ROS for pertinent symptoms related to these conditions.      Review of Systems  CONSTITUTIONAL: NEGATIVE for fever, chills, change in  weight  INTEGUMENTARY/SKIN: NEGATIVE for worrisome rashes, moles or lesions  EYES: NEGATIVE for vision changes or irritation  ENT/MOUTH: NEGATIVE for ear, mouth and throat problems  RESP: NEGATIVE for significant cough or SOB  CV: NEGATIVE for chest pain, palpitations or peripheral edema  GI: NEGATIVE for nausea, abdominal pain, heartburn, or change in bowel habits  : NEGATIVE for frequency, dysuria, or hematuria  MUSCULOSKELETAL: NEGATIVE for significant arthralgias or myalgia  NEURO: NEGATIVE for weakness, dizziness or paresthesias  ENDOCRINE: NEGATIVE for temperature intolerance, skin/hair changes  HEME: NEGATIVE for bleeding problems  PSYCHIATRIC: NEGATIVE for changes in mood or affect    Patient Active Problem List    Diagnosis Date Noted     Diverticulitis of large intestine without perforation or abscess without bleeding 10/31/2018     Priority: Medium     Back problem 05/03/2018     Priority: Medium     Vitamin D Deficiency      Priority: Medium     Created by Conversion  Replacement Utility updated for latest IMO load         Vaginal Discharge      Priority: Medium     Created by Conversion  Replacement Utility updated for latest IMO load         Cramp Of Limb      Priority: Medium     Created by Conversion         Decreased Concentrating Ability      Priority: Medium     Created by Conversion         Acne      Priority: Medium     Created by Conversion          Past Medical History:   Diagnosis Date     Motion sickness      Noninfectious ileitis     Diverticulitis     Other chronic pain     Wrists, hand     Uncomplicated asthma     As a child     Past Surgical History:   Procedure Laterality Date     COLONOSCOPY       WISDOM TOOTH EXTRACTION       Current Outpatient Medications   Medication Sig Dispense Refill     benzoyl peroxide 5 % external liquid [BENZOYL PEROXIDE 5 % EXTERNAL LIQUID] Apply twice a day as directed (Patient not taking: Reported on 1/10/2022) 227 g 0     biotin 300 mcg Tab [BIOTIN 300  MCG TAB] Take 1 tablet by mouth daily. 90 each 1     cholecalciferol, vitamin D3, 2,000 unit Tab [CHOLECALCIFEROL, VITAMIN D3, 2,000 UNIT TAB] Take by mouth.       clindamycin (CLINDAGEL) 1 % gel [CLINDAMYCIN (CLINDAGEL) 1 % GEL] APPLY EXTERNALLY TO THE AFFECTED AREA TWICE DAILY (Patient not taking: Reported on 1/10/2022) 60 g 0     dextroamphetamine-amphetamine (ADDERALL XR) 20 MG 24 hr capsule [DEXTROAMPHETAMINE-AMPHETAMINE (ADDERALL XR) 20 MG 24 HR CAPSULE] TAKE ONE CAPSULE BY MOUTH EVERY DAY  0     MULTIVIT &MINERALS/FERROUS FUM (MULTI VITAMIN ORAL) [MULTIVIT &MINERALS/FERROUS FUM (MULTI VITAMIN ORAL)] Take 1 tablet by mouth daily.       psyllium husk (METAMUCIL) 0.4 gram cap [PSYLLIUM HUSK (METAMUCIL) 0.4 GRAM CAP] Take by mouth. Metamucil Tablets (Patient not taking: Reported on 1/10/2022)         No Known Allergies     Social History     Tobacco Use     Smoking status: Never Smoker     Smokeless tobacco: Never Used   Substance Use Topics     Alcohol use: Yes     Comment: Alcoholic Drinks/day: rare     Family History   Problem Relation Age of Onset     Cervical Cancer Mother 35     Thyroid Disease Mother 45     Cancer Father 60        LN     Diabetes Maternal Grandmother      Dementia Maternal Grandfather      Dementia Paternal Grandmother      History   Drug Use No         Objective     There were no vitals taken for this visit.    Physical Exam    GENERAL APPEARANCE: healthy, alert and no distress     EYES: EOMI, PERRL     HENT: ear canals and TM's normal and nose and mouth without ulcers or lesions     NECK: no adenopathy, no asymmetry, masses, or scars and thyroid normal to palpation     RESP: lungs clear to auscultation - no rales, rhonchi or wheezes     CV: regular rates and rhythm, normal S1 S2, no S3 or S4 and no murmur, click or rub     ABDOMEN:  soft, nontender, no HSM or masses and bowel sounds normal     MS: extremities normal- no gross deformities noted, no evidence of inflammation in joints, FROM  in all extremities.     SKIN: no suspicious lesions or rashes     NEURO: Normal strength and tone, sensory exam grossly normal, mentation intact and speech normal     PSYCH: mentation appears normal. and affect normal/bright     LYMPHATICS: No cervical adenopathy    Recent Labs   Lab Test 01/10/22  1339   HGB 12.9   POTASSIUM 4.4        Diagnostics:  Recent Results (from the past 24 hour(s))   Hemoglobin    Collection Time: 04/11/22  9:53 AM   Result Value Ref Range    Hemoglobin 13.0 11.7 - 15.7 g/dL      No EKG required for low risk surgery (cataract, skin procedure, breast biopsy, etc).    Revised Cardiac Risk Index (RCRI):  The patient has the following serious cardiovascular risks for perioperative complications:   - No serious cardiac risks = 0 points     RCRI Interpretation: 0 points: Class I (very low risk - 0.4% complication rate)           Signed Electronically by: Arian Mccracken MD  Copy of this evaluation report is provided to requesting physician.

## 2022-04-11 NOTE — PROGRESS NOTES
[facility]  [address] [address]  Phone: [phone]  Fax: [phone]  Primary Provider: Niru Crawley  Pre-op Performing Provider: WALTER JOSEPH      PREOPERATIVE EVALUATION:  Today's date: 4/11/2022    Britney Jenkins is a 41 year old female who presents for a preoperative evaluation.    Surgical Information:  Surgery/Procedure: D&C  Surgery Location: [facility] [place]  Surgeon: Dr. Adams  :Surgery Date: 4/14/2022  Time of Surgery: 7:30AM  Where patient plans to recover: At home with family  Fax number for surgical facility: Note does not need to be faxed, will be available electronically in Epic.    Type of Anesthesia Anticipated: Choice    Assessment & Plan     The proposed surgical procedure is considered INTERMEDIATE risk.    Preop examination    - Hemoglobin; Future  - Basic metabolic panel; Future  - Hemoglobin  - Basic metabolic panel    Abnormal uterine bleeding        Risks and Recommendations:  The patient has the following additional risks and recommendations for perioperative complications:   - No identified additional risk factors other than previously addressed    Medication Instructions:  Patient is to take all scheduled medications on the day of surgery    RECOMMENDATION:  APPROVAL GIVEN to proceed with proposed procedure, without further diagnostic evaluation.    Review of external notes as documented above   Review of the result(s) of each unique test - labs                  Subjective     HPI related to upcoming procedure: Patient is having preoperative consultation today.  She is having a hysteroscopy and possible polyp removal for some dysfunctional uterine bleeding.  She has actually already had this preop done a few months ago so this is just an update to that exam.      Preop Questions 1/10/2022   1. Have you ever had a heart attack or stroke? No   2. Have you ever had surgery on your heart or blood vessels, such as a stent  placement, a coronary artery bypass, or surgery on an artery in your head, neck, heart, or legs? No   3. Do you have chest pain with activity? No   4. Do you have a history of  heart failure? No   5. Do you currently have a cold, bronchitis or symptoms of other infection? No   6. Do you have a cough, shortness of breath, or wheezing? No   7. Do you or anyone in your family have previous history of blood clots? UNKNOWN -    8. Do you or does anyone in your family have a serious bleeding problem such as prolonged bleeding following surgeries or cuts? UNKNOWN -    9. Have you ever had problems with anemia or been told to take iron pills? YES -    10. Have you had any abnormal blood loss such as black, tarry or bloody stools, or abnormal vaginal bleeding? YES -    11. Have you ever had a blood transfusion? No   12. Are you willing to have a blood transfusion if it is medically needed before, during, or after your surgery? Yes   13. Have you or any of your relatives ever had problems with anesthesia? UNKNOWN -    14. Do you have sleep apnea, excessive snoring or daytime drowsiness? No   15. Do you have any artifical heart valves or other implanted medical devices like a pacemaker, defibrillator, or continuous glucose monitor? No   16. Do you have artificial joints? No   17. Are you allergic to latex? No   18. Is there any chance that you may be pregnant? No     Health Care Directive:  Patient does not have a Health Care Directive or Living Will: Discussed advance care planning with patient; however, patient declined at this time.    Preoperative Review of :   reviewed - controlled substances reflected in medication list.      Status of Chronic Conditions:  See problem list for active medical problems.  Problems all longstanding and stable, except as noted/documented.  See ROS for pertinent symptoms related to these conditions.      Review of Systems  CONSTITUTIONAL: NEGATIVE for fever, chills, change in  weight  INTEGUMENTARY/SKIN: NEGATIVE for worrisome rashes, moles or lesions  EYES: NEGATIVE for vision changes or irritation  ENT/MOUTH: NEGATIVE for ear, mouth and throat problems  RESP: NEGATIVE for significant cough or SOB  CV: NEGATIVE for chest pain, palpitations or peripheral edema  GI: NEGATIVE for nausea, abdominal pain, heartburn, or change in bowel habits  : NEGATIVE for frequency, dysuria, or hematuria  MUSCULOSKELETAL: NEGATIVE for significant arthralgias or myalgia  NEURO: NEGATIVE for weakness, dizziness or paresthesias  ENDOCRINE: NEGATIVE for temperature intolerance, skin/hair changes  HEME: NEGATIVE for bleeding problems  PSYCHIATRIC: NEGATIVE for changes in mood or affect    Patient Active Problem List    Diagnosis Date Noted     Diverticulitis of large intestine without perforation or abscess without bleeding 10/31/2018     Priority: Medium     Back problem 05/03/2018     Priority: Medium     Vitamin D Deficiency      Priority: Medium     Created by Conversion  Replacement Utility updated for latest IMO load         Vaginal Discharge      Priority: Medium     Created by Conversion  Replacement Utility updated for latest IMO load         Cramp Of Limb      Priority: Medium     Created by Conversion         Decreased Concentrating Ability      Priority: Medium     Created by Conversion         Acne      Priority: Medium     Created by Conversion          Past Medical History:   Diagnosis Date     Motion sickness      Noninfectious ileitis     Diverticulitis     Other chronic pain     Wrists, hand     Uncomplicated asthma     As a child     Past Surgical History:   Procedure Laterality Date     COLONOSCOPY       WISDOM TOOTH EXTRACTION       Current Outpatient Medications   Medication Sig Dispense Refill     benzoyl peroxide 5 % external liquid [BENZOYL PEROXIDE 5 % EXTERNAL LIQUID] Apply twice a day as directed (Patient not taking: Reported on 1/10/2022) 227 g 0     biotin 300 mcg Tab [BIOTIN 300  MCG TAB] Take 1 tablet by mouth daily. 90 each 1     cholecalciferol, vitamin D3, 2,000 unit Tab [CHOLECALCIFEROL, VITAMIN D3, 2,000 UNIT TAB] Take by mouth.       clindamycin (CLINDAGEL) 1 % gel [CLINDAMYCIN (CLINDAGEL) 1 % GEL] APPLY EXTERNALLY TO THE AFFECTED AREA TWICE DAILY (Patient not taking: Reported on 1/10/2022) 60 g 0     dextroamphetamine-amphetamine (ADDERALL XR) 20 MG 24 hr capsule [DEXTROAMPHETAMINE-AMPHETAMINE (ADDERALL XR) 20 MG 24 HR CAPSULE] TAKE ONE CAPSULE BY MOUTH EVERY DAY  0     MULTIVIT &MINERALS/FERROUS FUM (MULTI VITAMIN ORAL) [MULTIVIT &MINERALS/FERROUS FUM (MULTI VITAMIN ORAL)] Take 1 tablet by mouth daily.       psyllium husk (METAMUCIL) 0.4 gram cap [PSYLLIUM HUSK (METAMUCIL) 0.4 GRAM CAP] Take by mouth. Metamucil Tablets (Patient not taking: Reported on 1/10/2022)         No Known Allergies     Social History     Tobacco Use     Smoking status: Never Smoker     Smokeless tobacco: Never Used   Substance Use Topics     Alcohol use: Yes     Comment: Alcoholic Drinks/day: rare     Family History   Problem Relation Age of Onset     Cervical Cancer Mother 35     Thyroid Disease Mother 45     Cancer Father 60        LN     Diabetes Maternal Grandmother      Dementia Maternal Grandfather      Dementia Paternal Grandmother      History   Drug Use No         Objective     There were no vitals taken for this visit.    Physical Exam    GENERAL APPEARANCE: healthy, alert and no distress     EYES: EOMI, PERRL     HENT: ear canals and TM's normal and nose and mouth without ulcers or lesions     NECK: no adenopathy, no asymmetry, masses, or scars and thyroid normal to palpation     RESP: lungs clear to auscultation - no rales, rhonchi or wheezes     CV: regular rates and rhythm, normal S1 S2, no S3 or S4 and no murmur, click or rub     ABDOMEN:  soft, nontender, no HSM or masses and bowel sounds normal     MS: extremities normal- no gross deformities noted, no evidence of inflammation in joints, FROM  in all extremities.     SKIN: no suspicious lesions or rashes     NEURO: Normal strength and tone, sensory exam grossly normal, mentation intact and speech normal     PSYCH: mentation appears normal. and affect normal/bright     LYMPHATICS: No cervical adenopathy    Recent Labs   Lab Test 01/10/22  1339   HGB 12.9   POTASSIUM 4.4        Diagnostics:  Recent Results (from the past 24 hour(s))   Hemoglobin    Collection Time: 04/11/22  9:53 AM   Result Value Ref Range    Hemoglobin 13.0 11.7 - 15.7 g/dL      No EKG required for low risk surgery (cataract, skin procedure, breast biopsy, etc).    Revised Cardiac Risk Index (RCRI):  The patient has the following serious cardiovascular risks for perioperative complications:   - No serious cardiac risks = 0 points     RCRI Interpretation: 0 points: Class I (very low risk - 0.4% complication rate)           Signed Electronically by: Arian Mccracken MD  Copy of this evaluation report is provided to requesting physician.

## 2022-04-12 LAB — SARS-COV-2 RNA RESP QL NAA+PROBE: NEGATIVE

## 2022-04-13 ENCOUNTER — ANESTHESIA EVENT (OUTPATIENT)
Dept: SURGERY | Facility: AMBULATORY SURGERY CENTER | Age: 42
End: 2022-04-13
Payer: COMMERCIAL

## 2022-04-14 ENCOUNTER — HOSPITAL ENCOUNTER (OUTPATIENT)
Facility: AMBULATORY SURGERY CENTER | Age: 42
Discharge: HOME | End: 2022-04-14
Attending: OBSTETRICS & GYNECOLOGY
Payer: COMMERCIAL

## 2022-04-14 ENCOUNTER — ANESTHESIA (OUTPATIENT)
Dept: SURGERY | Facility: AMBULATORY SURGERY CENTER | Age: 42
End: 2022-04-14
Payer: COMMERCIAL

## 2022-04-14 VITALS
HEIGHT: 62 IN | BODY MASS INDEX: 23.92 KG/M2 | DIASTOLIC BLOOD PRESSURE: 56 MMHG | WEIGHT: 129.98 LBS | RESPIRATION RATE: 16 BRPM | SYSTOLIC BLOOD PRESSURE: 97 MMHG | HEART RATE: 70 BPM | TEMPERATURE: 97.9 F | OXYGEN SATURATION: 100 %

## 2022-04-14 DIAGNOSIS — N84.0 ENDOMETRIAL POLYP: ICD-10-CM

## 2022-04-14 DIAGNOSIS — R93.89 THICKENED ENDOMETRIUM: ICD-10-CM

## 2022-04-14 LAB
HCG UR QL: NEGATIVE
INTERNAL QC OK POCT: NORMAL
POCT KIT EXPIRATION DATE: NORMAL
POCT KIT LOT NUMBER: NORMAL

## 2022-04-14 RX ORDER — KETOROLAC TROMETHAMINE 30 MG/ML
INJECTION, SOLUTION INTRAMUSCULAR; INTRAVENOUS PRN
Status: DISCONTINUED | OUTPATIENT
Start: 2022-04-14 | End: 2022-04-14

## 2022-04-14 RX ORDER — IBUPROFEN 800 MG/1
800 TABLET, FILM COATED ORAL ONCE
Status: DISCONTINUED | OUTPATIENT
Start: 2022-04-14 | End: 2022-04-15 | Stop reason: HOSPADM

## 2022-04-14 RX ORDER — LIDOCAINE HYDROCHLORIDE 10 MG/ML
INJECTION, SOLUTION EPIDURAL; INFILTRATION; INTRACAUDAL; PERINEURAL PRN
Status: DISCONTINUED | OUTPATIENT
Start: 2022-04-14 | End: 2022-04-14 | Stop reason: HOSPADM

## 2022-04-14 RX ORDER — ONDANSETRON 2 MG/ML
INJECTION INTRAMUSCULAR; INTRAVENOUS PRN
Status: DISCONTINUED | OUTPATIENT
Start: 2022-04-14 | End: 2022-04-14

## 2022-04-14 RX ORDER — LIDOCAINE 40 MG/G
CREAM TOPICAL
Status: DISCONTINUED | OUTPATIENT
Start: 2022-04-14 | End: 2022-04-15 | Stop reason: HOSPADM

## 2022-04-14 RX ORDER — HYDROMORPHONE HCL IN WATER/PF 6 MG/30 ML
0.2 PATIENT CONTROLLED ANALGESIA SYRINGE INTRAVENOUS EVERY 5 MIN PRN
Status: CANCELLED | OUTPATIENT
Start: 2022-04-14

## 2022-04-14 RX ORDER — OXYCODONE HYDROCHLORIDE 5 MG/1
5 TABLET ORAL EVERY 4 HOURS PRN
Status: DISCONTINUED | OUTPATIENT
Start: 2022-04-14 | End: 2022-04-15 | Stop reason: HOSPADM

## 2022-04-14 RX ORDER — PROPOFOL 10 MG/ML
INJECTION, EMULSION INTRAVENOUS PRN
Status: DISCONTINUED | OUTPATIENT
Start: 2022-04-14 | End: 2022-04-14

## 2022-04-14 RX ORDER — MEPERIDINE HYDROCHLORIDE 25 MG/ML
12.5 INJECTION INTRAMUSCULAR; INTRAVENOUS; SUBCUTANEOUS
Status: DISCONTINUED | OUTPATIENT
Start: 2022-04-14 | End: 2022-04-15 | Stop reason: HOSPADM

## 2022-04-14 RX ORDER — ACETAMINOPHEN 325 MG/1
975 TABLET ORAL ONCE
Status: COMPLETED | OUTPATIENT
Start: 2022-04-14 | End: 2022-04-14

## 2022-04-14 RX ORDER — FENTANYL CITRATE 0.05 MG/ML
25 INJECTION, SOLUTION INTRAMUSCULAR; INTRAVENOUS
Status: DISCONTINUED | OUTPATIENT
Start: 2022-04-14 | End: 2022-04-15 | Stop reason: HOSPADM

## 2022-04-14 RX ORDER — PROPOFOL 10 MG/ML
INJECTION, EMULSION INTRAVENOUS CONTINUOUS PRN
Status: DISCONTINUED | OUTPATIENT
Start: 2022-04-14 | End: 2022-04-14

## 2022-04-14 RX ORDER — DEXAMETHASONE SODIUM PHOSPHATE 4 MG/ML
INJECTION, SOLUTION INTRA-ARTICULAR; INTRALESIONAL; INTRAMUSCULAR; INTRAVENOUS; SOFT TISSUE PRN
Status: DISCONTINUED | OUTPATIENT
Start: 2022-04-14 | End: 2022-04-14

## 2022-04-14 RX ORDER — ACETAMINOPHEN 325 MG/1
975 TABLET ORAL ONCE
Status: DISCONTINUED | OUTPATIENT
Start: 2022-04-14 | End: 2022-04-15 | Stop reason: HOSPADM

## 2022-04-14 RX ORDER — IBUPROFEN 800 MG/1
800 TABLET, FILM COATED ORAL EVERY 6 HOURS PRN
Qty: 30 TABLET | Refills: 0 | Status: SHIPPED | OUTPATIENT
Start: 2022-04-14 | End: 2022-07-28 | Stop reason: DRUGHIGH

## 2022-04-14 RX ORDER — SODIUM CHLORIDE, SODIUM LACTATE, POTASSIUM CHLORIDE, CALCIUM CHLORIDE 600; 310; 30; 20 MG/100ML; MG/100ML; MG/100ML; MG/100ML
INJECTION, SOLUTION INTRAVENOUS CONTINUOUS
Status: DISCONTINUED | OUTPATIENT
Start: 2022-04-14 | End: 2022-04-15 | Stop reason: HOSPADM

## 2022-04-14 RX ORDER — ONDANSETRON 2 MG/ML
4 INJECTION INTRAMUSCULAR; INTRAVENOUS EVERY 30 MIN PRN
Status: DISCONTINUED | OUTPATIENT
Start: 2022-04-14 | End: 2022-04-15 | Stop reason: HOSPADM

## 2022-04-14 RX ORDER — LIDOCAINE HYDROCHLORIDE 20 MG/ML
INJECTION, SOLUTION INFILTRATION; PERINEURAL PRN
Status: DISCONTINUED | OUTPATIENT
Start: 2022-04-14 | End: 2022-04-14

## 2022-04-14 RX ORDER — FENTANYL CITRATE 0.05 MG/ML
25 INJECTION, SOLUTION INTRAMUSCULAR; INTRAVENOUS EVERY 5 MIN PRN
Status: CANCELLED | OUTPATIENT
Start: 2022-04-14

## 2022-04-14 RX ORDER — ONDANSETRON 4 MG/1
4 TABLET, ORALLY DISINTEGRATING ORAL EVERY 30 MIN PRN
Status: DISCONTINUED | OUTPATIENT
Start: 2022-04-14 | End: 2022-04-15 | Stop reason: HOSPADM

## 2022-04-14 RX ORDER — FENTANYL CITRATE 50 UG/ML
INJECTION, SOLUTION INTRAMUSCULAR; INTRAVENOUS PRN
Status: DISCONTINUED | OUTPATIENT
Start: 2022-04-14 | End: 2022-04-14

## 2022-04-14 RX ADMIN — KETOROLAC TROMETHAMINE 15 MG: 30 INJECTION, SOLUTION INTRAMUSCULAR; INTRAVENOUS at 08:00

## 2022-04-14 RX ADMIN — PROPOFOL 30 MG: 10 INJECTION, EMULSION INTRAVENOUS at 07:44

## 2022-04-14 RX ADMIN — PROPOFOL 200 MCG/KG/MIN: 10 INJECTION, EMULSION INTRAVENOUS at 07:44

## 2022-04-14 RX ADMIN — DEXAMETHASONE SODIUM PHOSPHATE 4 MG: 4 INJECTION, SOLUTION INTRA-ARTICULAR; INTRALESIONAL; INTRAMUSCULAR; INTRAVENOUS; SOFT TISSUE at 07:48

## 2022-04-14 RX ADMIN — FENTANYL CITRATE 50 MCG: 50 INJECTION, SOLUTION INTRAMUSCULAR; INTRAVENOUS at 07:42

## 2022-04-14 RX ADMIN — SODIUM CHLORIDE, SODIUM LACTATE, POTASSIUM CHLORIDE, CALCIUM CHLORIDE: 600; 310; 30; 20 INJECTION, SOLUTION INTRAVENOUS at 07:22

## 2022-04-14 RX ADMIN — ACETAMINOPHEN 975 MG: 325 TABLET ORAL at 07:16

## 2022-04-14 RX ADMIN — ONDANSETRON 4 MG: 2 INJECTION INTRAMUSCULAR; INTRAVENOUS at 07:48

## 2022-04-14 RX ADMIN — LIDOCAINE HYDROCHLORIDE 60 MG: 20 INJECTION, SOLUTION INFILTRATION; PERINEURAL at 07:42

## 2022-04-14 ASSESSMENT — ENCOUNTER SYMPTOMS: SEIZURES: 0

## 2022-04-14 NOTE — ANESTHESIA PREPROCEDURE EVALUATION
Anesthesia Pre-Procedure Evaluation    Patient: Britney Jenkins   MRN: [id number] : 1980        Procedure : Procedure(s):  HYSTEROSCOPY, WITH DILATION AND CURETTAGE , POSSIBLE ENDOMETRIAL POLYPECTOMY, POSSIBLE INTRAUTERINE DEVICE PLACEMENT          Past Medical History:   Diagnosis Date     Motion sickness      Noninfectious ileitis     Diverticulitis     Other chronic pain     Wrists, hand     Uncomplicated asthma     As a child      Past Surgical History:   Procedure Laterality Date     COLONOSCOPY       RECTAL PROLAPSE REPAIR       WISDOM TOOTH EXTRACTION        No Known Allergies   Social History     Tobacco Use     Smoking status: Never Smoker     Smokeless tobacco: Never Used   Substance Use Topics     Alcohol use: Not Currently      Wt Readings from Last 1 Encounters:   22 59 kg (129 lb 15.7 oz)        Anesthesia Evaluation            ROS/MED HX  ENT/Pulmonary:  - neg pulmonary ROS   (+) asthma  (-) sleep apnea   Neurologic: Comment: Chronic fatigue syndrome   (-) no seizures and no CVA   Cardiovascular:  - neg cardiovascular ROS  (-) CAD   METS/Exercise Tolerance:     Hematologic:  - neg hematologic  ROS     Musculoskeletal:  - neg musculoskeletal ROS     GI/Hepatic:  - neg GI/hepatic ROS     Renal/Genitourinary:  - neg Renal ROS     Endo:  - neg endo ROS     Psychiatric/Substance Use:  - neg psychiatric ROS     Infectious Disease:  - neg infectious disease ROS  (-) Recent Fever   Malignancy:       Other:            Physical Exam    Airway  airway exam normal      Mallampati: II   TM distance: > 3 FB   Neck ROM: full   Mouth opening: > 3 cm    Respiratory Devices and Support         Dental  no notable dental history         Cardiovascular   cardiovascular exam normal       Rhythm and rate: regular and normal     Pulmonary   pulmonary exam normal        breath sounds clear to auscultation           OUTSIDE LABS:  CBC:   Lab Results   Component Value Date    WBC 15.0 (H) 2018    HGB  13.0 04/11/2022    HGB 12.9 01/10/2022    HCT 36.1 04/09/2018     04/09/2018     BMP:   Lab Results   Component Value Date     04/11/2022     (L) 04/09/2018    POTASSIUM 4.3 04/11/2022    POTASSIUM 4.4 01/10/2022    CHLORIDE 104 04/11/2022    CHLORIDE 101 04/09/2018    CO2 25 04/11/2022    CO2 27 04/09/2018    BUN 11 04/11/2022    BUN 10 04/09/2018    CR 0.84 04/11/2022    CR 0.76 04/09/2018    GLC 81 04/11/2022     04/09/2018     COAGS: No results found for: PTT, INR, FIBR  POC:   Lab Results   Component Value Date    HCG Negative 04/14/2022     HEPATIC:   Lab Results   Component Value Date    ALBUMIN 3.6 04/09/2018    PROTTOTAL 6.9 04/09/2018    ALT 29 04/09/2018    AST 21 04/09/2018    ALKPHOS 46 04/09/2018    BILITOTAL 0.6 04/09/2018     OTHER:   Lab Results   Component Value Date    LACT 0.8 04/09/2018    A1C 5.0 10/31/2018    YOSELIN 10.0 04/11/2022    LIPASE 10 04/09/2018    TSH 1.73 10/31/2018       Anesthesia Plan    ASA Status:  2      Anesthesia Type: MAC.              Consents    Anesthesia Plan(s) and associated risks, benefits, and realistic alternatives discussed. Questions answered and patient/representative(s) expressed understanding.    - Discussed:     - Discussed with:  Patient         Postoperative Care    Pain management: Multi-modal analgesia.   PONV prophylaxis: Ondansetron (or other 5HT-3), Dexamethasone or Solumedrol     Comments:                Tree Carreon MD

## 2022-04-14 NOTE — ANESTHESIA POSTPROCEDURE EVALUATION
Patient: Britney Jenkins    Procedure: Procedure(s):  HYSTEROSCOPY, WITH DILATION AND CURETTAGE, ENDOMETRIAL POLYPECTOMY       Anesthesia Type:  MAC    Note:  Disposition: Outpatient   Postop Pain Control: Uneventful            Sign Out: Well controlled pain   PONV: No   Neuro/Psych: Uneventful            Sign Out: Acceptable/Baseline neuro status   Airway/Respiratory: Uneventful            Sign Out: Acceptable/Baseline resp. status   CV/Hemodynamics: Uneventful            Sign Out: Acceptable CV status; No obvious hypovolemia; No obvious fluid overload   Other NRE: NONE   DID A NON-ROUTINE EVENT OCCUR? No           Last vitals:  Vitals Value Taken Time   BP 92/53 04/14/22 0906   Temp 97.9  F (36.6  C) 04/14/22 0809   Pulse 69 04/14/22 0909   Resp 16 04/14/22 0845   SpO2 99 % 04/14/22 0909   Vitals shown include unvalidated device data.    Electronically Signed By: Tree Carreon MD  April 14, 2022  9:11 AM

## 2022-04-14 NOTE — INTERVAL H&P NOTE
I have reviewed the surgical (or preoperative) H&P that is linked to this encounter, and examined the patient. There are no significant changes.    Saira Adams, DO  [facility]  [phone]

## 2022-04-14 NOTE — ANESTHESIA CARE TRANSFER NOTE
Patient: Britney Jenkins    Procedure: Procedure(s):  HYSTEROSCOPY, WITH DILATION AND CURETTAGE, ENDOMETRIAL POLYPECTOMY       Diagnosis: Endometrial polyp [N84.0]  Thickened endometrium [R93.89]  Diagnosis Additional Information: No value filed.    Anesthesia Type:   MAC     Note:    Oropharynx: oropharynx clear of all foreign objects and spontaneously breathing  Level of Consciousness: drowsy  Oxygen Supplementation: room air  Level of Supplemental Oxygen (L/min / FiO2): 0  Independent Airway: airway patency satisfactory and stable  Dentition: dentition unchanged  Vital Signs Stable: post-procedure vital signs reviewed and stable  Report to RN Given: handoff report given  Patient transferred to: Phase II    Handoff Report: Identifed the Patient, Identified the Reponsible Provider, Reviewed the pertinent medical history, Discussed the surgical course, Reviewed Intra-OP anesthesia mangement and issues during anesthesia, Set expectations for post-procedure period and Allowed opportunity for questions and acknowledgement of understanding      Vitals:  Vitals Value Taken Time   /74 04/14/22 0809   Temp 97.9  F (36.6  C) 04/14/22 0809   Pulse 60 04/14/22 0809   Resp 16 04/14/22 0809   SpO2 97 % 04/14/22 0809       Electronically Signed By: LANI COLBY CRNA  April 14, 2022  8:10 AM

## 2022-04-14 NOTE — DISCHARGE INSTRUCTIONS
You have received 975 mg of Acetaminophen (Tylenol) at 7:15am.  Please do not take an additional dose of Tylenol until 11:15 or after.    Do not exceed 4,000 mg of acetaminophen during a 24 hour period and keep in mind that acetaminophen can also be found in many over-the-counter cold medications as well as narcotics that may be given for pain.      You received a dose of IV Toradol at 8am. Please do not take any additional Ibuprofen products (Aleve/Advil/Motrin/Naproxen/Celebrex) until after 2pm.

## 2022-04-14 NOTE — OP NOTE
Hysteroscopy, D&C    Name: Britney Jenkins   MRN: [id number]   DATE OF SERVICE:  4/14/2022     PREOPERATIVE DIAGNOSIS: abnormal uterine bleeding, uterine polyp and thickened endometrium    POSTOPERATIVE DIAGNOSIS: Same as above    PROCEDURES: Hysteroscopy, dilatation and curettage, endometrial polypectomy    SURGEON: Saira Adams DO     ASSISTANT: none      ANESTHESIA:  mac      ESTIMATED BLOOD LOSS:   2 cc      HISTORY OF PRESENT ILLNESS:  This is a 41 year old year old female with history of abnormal uterine bleeding, uterine polyp and thickened endometrium. She had a transvaginal ultrasound which showed thickened endometrium and possible endometrial polyp. She was given informed consent for the above procedure. The risks, benefits and alternatives were discussed with her. She expressed understanding and wished to proceed.       PROCEDURE:  The patient was brought to the operating room and after induction of general anesthesia was prepped and draped in the dorsal lithotomy position. A time out was called and the patient and the procedure were verified.  A bimanual exam was done, indicating normal, anteverted uterus. No other abnormalities were noted.       A sterile speculum was placed. The anterior lip of the cervix were grasped with single tooth tenaculum. 1%lidocaine was placed at the 4 and 8 o'clock positions. The cervix was gently dilated using La dilators and the hysteroscope was introduced. Cavity of the uterus was noted to have an endometrial polyp near the left fundus and thickened tissue throughout. Uterus was sounded to 8cm. The myosure device was utilized to remove the endometrial polyp and thickened endometrial tissue. The hysteroscope was then removed and a gentle sharp curettage was performed. In each case all quadrants were sampled, and the tissue was submitted for pathologic exam. At this point good hemostasis was noted with this portion of the procedure. Instruments were removed from  vagina. No active bleeding was noted. Sponge and needle counts were correct X 2. She was taken to recovery in stable condition.      Saira Adams,   [facility]  [phone]

## 2022-06-15 ENCOUNTER — TELEPHONE (OUTPATIENT)
Dept: LAB | Facility: CLINIC | Age: 42
End: 2022-06-15

## 2022-06-15 NOTE — PROGRESS NOTES
Patient is on our lab schedule Monday. Appt notes say UA lab, Drug screening. You are listed as her PCP. Please review and place orders as needed. Thanks

## 2022-06-16 NOTE — TELEPHONE ENCOUNTER
Patient hasn't seen me  Since 2018, not sure why she is on lab schedule.     Niru Crawley MD 6/16/2022 8:51 AM   [facility].  [phone]

## 2022-06-20 ENCOUNTER — LAB (OUTPATIENT)
Dept: LAB | Facility: CLINIC | Age: 42
End: 2022-06-20
Payer: COMMERCIAL

## 2022-06-20 ENCOUNTER — TELEPHONE (OUTPATIENT)
Dept: LAB | Facility: CLINIC | Age: 42
End: 2022-06-20

## 2022-06-20 DIAGNOSIS — Z79.899 HIGH RISK MEDICATIONS (NOT ANTICOAGULANTS) LONG-TERM USE: Primary | ICD-10-CM

## 2022-06-20 LAB
AMPHETAMINES UR QL SCN: NORMAL
BARBITURATES UR QL: NORMAL
BENZODIAZ UR QL: NORMAL
CANNABINOIDS UR QL SCN: NORMAL
COCAINE UR QL: NORMAL
CREAT UR-MCNC: 10 MG/DL
OPIATES UR QL SCN: NORMAL
OXYCODONE UR QL: NORMAL
PCP UR QL SCN: NORMAL

## 2022-06-20 PROCEDURE — 80307 DRUG TEST PRSMV CHEM ANLYZR: CPT

## 2022-06-20 NOTE — PROGRESS NOTES
Left message for patient. Notified her that there is no lab order placed in her chart and labs cannot be done. Wondering if she is hand carrying an order.

## 2022-07-28 ENCOUNTER — HOSPITAL ENCOUNTER (OUTPATIENT)
Dept: CT IMAGING | Facility: CLINIC | Age: 42
Discharge: HOME | End: 2022-07-28
Attending: FAMILY MEDICINE | Admitting: FAMILY MEDICINE
Payer: COMMERCIAL

## 2022-07-28 ENCOUNTER — HOSPITAL ENCOUNTER (OUTPATIENT)
Facility: CLINIC | Age: 42
Setting detail: OBSERVATION
Discharge: HOME | End: 2022-07-30
Attending: EMERGENCY MEDICINE | Admitting: INTERNAL MEDICINE
Payer: COMMERCIAL

## 2022-07-28 ENCOUNTER — OFFICE VISIT (OUTPATIENT)
Dept: FAMILY MEDICINE | Facility: CLINIC | Age: 42
End: 2022-07-28
Payer: COMMERCIAL

## 2022-07-28 VITALS
BODY MASS INDEX: 23.62 KG/M2 | RESPIRATION RATE: 16 BRPM | OXYGEN SATURATION: 97 % | DIASTOLIC BLOOD PRESSURE: 71 MMHG | SYSTOLIC BLOOD PRESSURE: 104 MMHG | WEIGHT: 128 LBS | HEART RATE: 86 BPM | TEMPERATURE: 98.5 F

## 2022-07-28 DIAGNOSIS — K57.92 ACUTE DIVERTICULITIS: Primary | ICD-10-CM

## 2022-07-28 DIAGNOSIS — K57.32 DIVERTICULITIS OF COLON: ICD-10-CM

## 2022-07-28 DIAGNOSIS — R10.32 LLQ ABDOMINAL PAIN: ICD-10-CM

## 2022-07-28 LAB
ALBUMIN SERPL-MCNC: 4 G/DL (ref 3.5–5)
ALBUMIN UR-MCNC: NEGATIVE MG/DL
ALP SERPL-CCNC: 50 U/L (ref 45–120)
ALT SERPL W P-5'-P-CCNC: 16 U/L (ref 0–45)
ANION GAP SERPL CALCULATED.3IONS-SCNC: 11 MMOL/L (ref 5–18)
APPEARANCE UR: CLEAR
AST SERPL W P-5'-P-CCNC: 16 U/L (ref 0–40)
BASOPHILS # BLD AUTO: 0 10E3/UL (ref 0–0.2)
BASOPHILS # BLD AUTO: 0 10E3/UL (ref 0–0.2)
BASOPHILS NFR BLD AUTO: 0 %
BASOPHILS NFR BLD AUTO: 0 %
BILIRUB SERPL-MCNC: 0.7 MG/DL (ref 0–1)
BILIRUB UR QL STRIP: NEGATIVE
BUN SERPL-MCNC: 8 MG/DL (ref 8–22)
CALCIUM SERPL-MCNC: 9.4 MG/DL (ref 8.5–10.5)
CHLORIDE BLD-SCNC: 100 MMOL/L (ref 98–107)
CO2 SERPL-SCNC: 24 MMOL/L (ref 22–31)
COLOR UR AUTO: YELLOW
CREAT SERPL-MCNC: 0.76 MG/DL (ref 0.6–1.1)
EOSINOPHIL # BLD AUTO: 0.1 10E3/UL (ref 0–0.7)
EOSINOPHIL # BLD AUTO: 0.1 10E3/UL (ref 0–0.7)
EOSINOPHIL NFR BLD AUTO: 1 %
EOSINOPHIL NFR BLD AUTO: 1 %
ERYTHROCYTE [DISTWIDTH] IN BLOOD BY AUTOMATED COUNT: 11.5 % (ref 10–15)
ERYTHROCYTE [DISTWIDTH] IN BLOOD BY AUTOMATED COUNT: 11.6 % (ref 10–15)
GFR SERPL CREATININE-BSD FRML MDRD: >90 ML/MIN/1.73M2
GLUCOSE BLD-MCNC: 78 MG/DL (ref 70–125)
GLUCOSE UR STRIP-MCNC: NEGATIVE MG/DL
HCG SERPL QL: NEGATIVE
HCG UR QL: NEGATIVE
HCT VFR BLD AUTO: 38 % (ref 35–47)
HCT VFR BLD AUTO: 39 % (ref 35–47)
HGB BLD-MCNC: 12.8 G/DL (ref 11.7–15.7)
HGB BLD-MCNC: 13.1 G/DL (ref 11.7–15.7)
HGB UR QL STRIP: NEGATIVE
IMM GRANULOCYTES # BLD: 0 10E3/UL
IMM GRANULOCYTES # BLD: 0.1 10E3/UL
IMM GRANULOCYTES NFR BLD: 0 %
IMM GRANULOCYTES NFR BLD: 1 %
KETONES UR STRIP-MCNC: NEGATIVE MG/DL
LEUKOCYTE ESTERASE UR QL STRIP: NEGATIVE
LYMPHOCYTES # BLD AUTO: 1 10E3/UL (ref 0.8–5.3)
LYMPHOCYTES # BLD AUTO: 1.1 10E3/UL (ref 0.8–5.3)
LYMPHOCYTES NFR BLD AUTO: 7 %
LYMPHOCYTES NFR BLD AUTO: 7 %
MCH RBC QN AUTO: 31.5 PG (ref 26.5–33)
MCH RBC QN AUTO: 31.7 PG (ref 26.5–33)
MCHC RBC AUTO-ENTMCNC: 33.6 G/DL (ref 31.5–36.5)
MCHC RBC AUTO-ENTMCNC: 33.7 G/DL (ref 31.5–36.5)
MCV RBC AUTO: 94 FL (ref 78–100)
MCV RBC AUTO: 94 FL (ref 78–100)
MONOCYTES # BLD AUTO: 1.2 10E3/UL (ref 0–1.3)
MONOCYTES # BLD AUTO: 1.4 10E3/UL (ref 0–1.3)
MONOCYTES NFR BLD AUTO: 8 %
MONOCYTES NFR BLD AUTO: 9 %
NEUTROPHILS # BLD AUTO: 12.8 10E3/UL (ref 1.6–8.3)
NEUTROPHILS # BLD AUTO: 13.4 10E3/UL (ref 1.6–8.3)
NEUTROPHILS NFR BLD AUTO: 83 %
NEUTROPHILS NFR BLD AUTO: 84 %
NITRATE UR QL: NEGATIVE
NRBC # BLD AUTO: 0 10E3/UL
NRBC BLD AUTO-RTO: 0 /100
PH UR STRIP: 7 [PH] (ref 5–8)
PLATELET # BLD AUTO: 319 10E3/UL (ref 150–450)
PLATELET # BLD AUTO: 337 10E3/UL (ref 150–450)
POTASSIUM BLD-SCNC: 3.6 MMOL/L (ref 3.5–5)
PROT SERPL-MCNC: 7.6 G/DL (ref 6–8)
RBC # BLD AUTO: 4.06 10E6/UL (ref 3.8–5.2)
RBC # BLD AUTO: 4.13 10E6/UL (ref 3.8–5.2)
SARS-COV-2 RNA RESP QL NAA+PROBE: NEGATIVE
SODIUM SERPL-SCNC: 135 MMOL/L (ref 136–145)
SP GR UR STRIP: 1.01 (ref 1–1.03)
UROBILINOGEN UR STRIP-ACNC: 0.2 E.U./DL
WBC # BLD AUTO: 15.1 10E3/UL (ref 4–11)
WBC # BLD AUTO: 16 10E3/UL (ref 4–11)

## 2022-07-28 PROCEDURE — 96374 THER/PROPH/DIAG INJ IV PUSH: CPT

## 2022-07-28 PROCEDURE — 250N000013 HC RX MED GY IP 250 OP 250 PS 637: Performed by: INTERNAL MEDICINE

## 2022-07-28 PROCEDURE — 74177 CT ABD & PELVIS W/CONTRAST: CPT

## 2022-07-28 PROCEDURE — 80053 COMPREHEN METABOLIC PANEL: CPT | Performed by: EMERGENCY MEDICINE

## 2022-07-28 PROCEDURE — 84703 CHORIONIC GONADOTROPIN ASSAY: CPT | Performed by: EMERGENCY MEDICINE

## 2022-07-28 PROCEDURE — 99285 EMERGENCY DEPT VISIT HI MDM: CPT

## 2022-07-28 PROCEDURE — 87040 BLOOD CULTURE FOR BACTERIA: CPT | Performed by: EMERGENCY MEDICINE

## 2022-07-28 PROCEDURE — 85025 COMPLETE CBC W/AUTO DIFF WBC: CPT | Performed by: FAMILY MEDICINE

## 2022-07-28 PROCEDURE — 81025 URINE PREGNANCY TEST: CPT | Performed by: FAMILY MEDICINE

## 2022-07-28 PROCEDURE — 99220 PR INITIAL OBSERVATION CARE,LEVEL III: CPT | Performed by: INTERNAL MEDICINE

## 2022-07-28 PROCEDURE — 250N000011 HC RX IP 250 OP 636: Performed by: FAMILY MEDICINE

## 2022-07-28 PROCEDURE — C9803 HOPD COVID-19 SPEC COLLECT: HCPCS

## 2022-07-28 PROCEDURE — 81003 URINALYSIS AUTO W/O SCOPE: CPT | Performed by: FAMILY MEDICINE

## 2022-07-28 PROCEDURE — 99207 PR INPT ADMISSION FROM CLINIC: CPT | Performed by: FAMILY MEDICINE

## 2022-07-28 PROCEDURE — G0378 HOSPITAL OBSERVATION PER HR: HCPCS

## 2022-07-28 PROCEDURE — 36415 COLL VENOUS BLD VENIPUNCTURE: CPT | Performed by: FAMILY MEDICINE

## 2022-07-28 PROCEDURE — 36415 COLL VENOUS BLD VENIPUNCTURE: CPT | Performed by: EMERGENCY MEDICINE

## 2022-07-28 PROCEDURE — 96375 TX/PRO/DX INJ NEW DRUG ADDON: CPT

## 2022-07-28 PROCEDURE — 85025 COMPLETE CBC W/AUTO DIFF WBC: CPT | Performed by: EMERGENCY MEDICINE

## 2022-07-28 PROCEDURE — U0003 INFECTIOUS AGENT DETECTION BY NUCLEIC ACID (DNA OR RNA); SEVERE ACUTE RESPIRATORY SYNDROME CORONAVIRUS 2 (SARS-COV-2) (CORONAVIRUS DISEASE [COVID-19]), AMPLIFIED PROBE TECHNIQUE, MAKING USE OF HIGH THROUGHPUT TECHNOLOGIES AS DESCRIBED BY CMS-2020-01-R: HCPCS | Performed by: EMERGENCY MEDICINE

## 2022-07-28 PROCEDURE — 250N000011 HC RX IP 250 OP 636: Performed by: EMERGENCY MEDICINE

## 2022-07-28 RX ORDER — MULTIVITAMIN,THERAPEUTIC
1 TABLET ORAL DAILY
COMMUNITY

## 2022-07-28 RX ORDER — IBUPROFEN 200 MG
400 TABLET ORAL EVERY 6 HOURS PRN
COMMUNITY

## 2022-07-28 RX ORDER — PIPERACILLIN SODIUM, TAZOBACTAM SODIUM 3; .375 G/15ML; G/15ML
3.38 INJECTION, POWDER, LYOPHILIZED, FOR SOLUTION INTRAVENOUS ONCE
Status: COMPLETED | OUTPATIENT
Start: 2022-07-28 | End: 2022-07-28

## 2022-07-28 RX ORDER — ONDANSETRON 4 MG/1
4 TABLET, ORALLY DISINTEGRATING ORAL EVERY 6 HOURS PRN
Status: DISCONTINUED | OUTPATIENT
Start: 2022-07-28 | End: 2022-07-30 | Stop reason: HOSPADM

## 2022-07-28 RX ORDER — IOPAMIDOL 755 MG/ML
100 INJECTION, SOLUTION INTRAVASCULAR ONCE
Status: COMPLETED | OUTPATIENT
Start: 2022-07-28 | End: 2022-07-28

## 2022-07-28 RX ORDER — MORPHINE SULFATE 4 MG/ML
4 INJECTION, SOLUTION INTRAMUSCULAR; INTRAVENOUS ONCE
Status: COMPLETED | OUTPATIENT
Start: 2022-07-28 | End: 2022-07-28

## 2022-07-28 RX ORDER — PIPERACILLIN SODIUM, TAZOBACTAM SODIUM 3; .375 G/15ML; G/15ML
3.38 INJECTION, POWDER, LYOPHILIZED, FOR SOLUTION INTRAVENOUS EVERY 8 HOURS
Status: DISCONTINUED | OUTPATIENT
Start: 2022-07-29 | End: 2022-07-30 | Stop reason: HOSPADM

## 2022-07-28 RX ORDER — CALCIUM CARBONATE 500(1250)
1 TABLET ORAL DAILY
COMMUNITY

## 2022-07-28 RX ORDER — ONDANSETRON 2 MG/ML
4 INJECTION INTRAMUSCULAR; INTRAVENOUS EVERY 6 HOURS PRN
Status: DISCONTINUED | OUTPATIENT
Start: 2022-07-28 | End: 2022-07-30 | Stop reason: HOSPADM

## 2022-07-28 RX ORDER — FERROUS SULFATE 325(65) MG
325 TABLET ORAL
COMMUNITY

## 2022-07-28 RX ORDER — OXYCODONE HYDROCHLORIDE 5 MG/1
5 TABLET ORAL EVERY 4 HOURS PRN
Status: DISCONTINUED | OUTPATIENT
Start: 2022-07-28 | End: 2022-07-30 | Stop reason: HOSPADM

## 2022-07-28 RX ADMIN — OXYCODONE HYDROCHLORIDE 5 MG: 5 TABLET ORAL at 21:51

## 2022-07-28 RX ADMIN — PIPERACILLIN AND TAZOBACTAM 3.38 G: 3; .375 INJECTION, POWDER, FOR SOLUTION INTRAVENOUS at 20:46

## 2022-07-28 RX ADMIN — IOPAMIDOL 100 ML: 755 INJECTION, SOLUTION INTRAVENOUS at 18:16

## 2022-07-28 RX ADMIN — MORPHINE SULFATE 4 MG: 4 INJECTION, SOLUTION INTRAMUSCULAR; INTRAVENOUS at 20:45

## 2022-07-29 VITALS
HEART RATE: 72 BPM | SYSTOLIC BLOOD PRESSURE: 107 MMHG | WEIGHT: 128 LBS | RESPIRATION RATE: 18 BRPM | DIASTOLIC BLOOD PRESSURE: 60 MMHG | OXYGEN SATURATION: 97 % | TEMPERATURE: 98.3 F | BODY MASS INDEX: 23.62 KG/M2

## 2022-07-29 LAB
ANION GAP SERPL CALCULATED.3IONS-SCNC: 3 MMOL/L (ref 5–18)
BUN SERPL-MCNC: 7 MG/DL (ref 8–22)
CALCIUM SERPL-MCNC: 8.8 MG/DL (ref 8.5–10.5)
CHLORIDE BLD-SCNC: 102 MMOL/L (ref 98–107)
CO2 SERPL-SCNC: 30 MMOL/L (ref 22–31)
CREAT SERPL-MCNC: 0.83 MG/DL (ref 0.6–1.1)
ERYTHROCYTE [DISTWIDTH] IN BLOOD BY AUTOMATED COUNT: 11.6 % (ref 10–15)
GFR SERPL CREATININE-BSD FRML MDRD: 90 ML/MIN/1.73M2
GLUCOSE BLD-MCNC: 94 MG/DL (ref 70–125)
HCT VFR BLD AUTO: 34.8 % (ref 35–47)
HGB BLD-MCNC: 11.5 G/DL (ref 11.7–15.7)
MCH RBC QN AUTO: 31 PG (ref 26.5–33)
MCHC RBC AUTO-ENTMCNC: 33 G/DL (ref 31.5–36.5)
MCV RBC AUTO: 94 FL (ref 78–100)
PLATELET # BLD AUTO: 302 10E3/UL (ref 150–450)
POTASSIUM BLD-SCNC: 3.8 MMOL/L (ref 3.5–5)
RBC # BLD AUTO: 3.71 10E6/UL (ref 3.8–5.2)
SODIUM SERPL-SCNC: 135 MMOL/L (ref 136–145)
WBC # BLD AUTO: 14.1 10E3/UL (ref 4–11)

## 2022-07-29 PROCEDURE — 250N000013 HC RX MED GY IP 250 OP 250 PS 637: Performed by: INTERNAL MEDICINE

## 2022-07-29 PROCEDURE — 96376 TX/PRO/DX INJ SAME DRUG ADON: CPT

## 2022-07-29 PROCEDURE — 85027 COMPLETE CBC AUTOMATED: CPT | Performed by: INTERNAL MEDICINE

## 2022-07-29 PROCEDURE — 96375 TX/PRO/DX INJ NEW DRUG ADDON: CPT

## 2022-07-29 PROCEDURE — 99217 PR OBSERVATION CARE DISCHARGE: CPT | Performed by: INTERNAL MEDICINE

## 2022-07-29 PROCEDURE — 250N000011 HC RX IP 250 OP 636: Performed by: INTERNAL MEDICINE

## 2022-07-29 PROCEDURE — 36415 COLL VENOUS BLD VENIPUNCTURE: CPT | Performed by: INTERNAL MEDICINE

## 2022-07-29 PROCEDURE — 82310 ASSAY OF CALCIUM: CPT | Performed by: INTERNAL MEDICINE

## 2022-07-29 PROCEDURE — G0378 HOSPITAL OBSERVATION PER HR: HCPCS

## 2022-07-29 RX ORDER — ONDANSETRON 4 MG/1
4 TABLET, ORALLY DISINTEGRATING ORAL EVERY 6 HOURS PRN
Qty: 10 TABLET | Refills: 0 | Status: SHIPPED | OUTPATIENT
Start: 2022-07-29

## 2022-07-29 RX ORDER — KETOROLAC TROMETHAMINE 30 MG/ML
30 INJECTION, SOLUTION INTRAMUSCULAR; INTRAVENOUS EVERY 6 HOURS PRN
Status: DISCONTINUED | OUTPATIENT
Start: 2022-07-29 | End: 2022-07-30 | Stop reason: HOSPADM

## 2022-07-29 RX ORDER — BUTALBITAL, ACETAMINOPHEN AND CAFFEINE 50; 325; 40 MG/1; MG/1; MG/1
1 TABLET ORAL EVERY 4 HOURS PRN
Status: DISCONTINUED | OUTPATIENT
Start: 2022-07-29 | End: 2022-07-30 | Stop reason: HOSPADM

## 2022-07-29 RX ADMIN — PIPERACILLIN AND TAZOBACTAM 3.38 G: 3; .375 INJECTION, POWDER, FOR SOLUTION INTRAVENOUS at 02:49

## 2022-07-29 RX ADMIN — PIPERACILLIN AND TAZOBACTAM 3.38 G: 3; .375 INJECTION, POWDER, FOR SOLUTION INTRAVENOUS at 10:55

## 2022-07-29 RX ADMIN — OXYCODONE HYDROCHLORIDE 5 MG: 5 TABLET ORAL at 02:54

## 2022-07-29 RX ADMIN — ONDANSETRON 4 MG: 2 INJECTION INTRAMUSCULAR; INTRAVENOUS at 10:31

## 2022-07-29 RX ADMIN — KETOROLAC TROMETHAMINE 30 MG: 30 INJECTION, SOLUTION INTRAMUSCULAR; INTRAVENOUS at 10:25

## 2022-07-29 NOTE — PROGRESS NOTES
Assessment/Plan:   LLQ abdominal pain  Acute diverticulitis, left descending colon.   1 week of left lower quadrant pain and low back pain with worse pain the last 2 days. Today increased pain with movement, urge to defecate, nausea. No fever or chills.   UA is clear, UPT negative.   CBC with elevated WBC 16  CT scan shows acute diverticulitis of the left descending colon with nonspecific fluid. Discussed with radiologist who confirmed no abscess or free air but suggesting possible microperforation. Given findings on CT and elevated WBC (16) recommend IV antibiotics at least for a dose or two to get started with treatment. Unable to directly admit to the hospital at this time so discussed with ED provider and they will see her and initiate IV antibiotics and further evaluation as indicated. Patient is agreeable and will walk over to the ED from radiology now.   - UA macro with reflex to Microscopic and Culture - Clinc Collect  - CT Abdomen Pelvis w Contrast; Future  - CBC with platelets and differential  - HCG Qual, Urine ([id number])    To ED for IV antibiotic dose and further management. Report called to ED MD      Subjective:     Britney Jenkins is a 41 year old female who presents for evaluation of lower abdominal pain. She has known diverticulosis and history of diverticulitis. She has experienced left lower abdominal pain and low back pain for about a week but it has become more severe the last 2 days. Today there is pain with movement. It fluctuates in intensity but never goes away. Some nausea, decreased appetite. Some increased pain with eating. Some change in BM. No fever or chills. No urinary symptoms. Recent menses. Some improvement in the past days after taking ibuprofen.   No URI or respiratory symptoms. Some vague shortness of breath due to pain in abdomen with deep breaths. No rash.      No Known Allergies     Current Outpatient Medications   Medication     biotin 300 mcg Tab     CALCIUM PO      Cholecalciferol (VITAMIN D3 PO)     dextroamphetamine-amphetamine (ADDERALL XR) 20 MG 24 hr capsule     FIBER PO     ibuprofen (ADVIL/MOTRIN) 800 MG tablet     Multiple Vitamin (MULTIVITAMIN PO)     Nutritional Supplements (DHEA PO)     progesterone (PROMETRIUM) 200 MG capsule     spironolactone (ALDACTONE) 100 MG tablet     No current facility-administered medications for this visit.     Patient Active Problem List   Diagnosis     Acne     Vitamin D Deficiency     Decreased Concentrating Ability     Abnormal uterine bleeding     Chronic fatigue syndrome     Endometrial thickening on ultrasound       Objective:     /71   Pulse 86   Temp 98.5  F (36.9  C)   Resp 16   Wt 58.1 kg (128 lb)   LMP 07/21/2022   SpO2 97%   Breastfeeding No   BMI 23.62 kg/m      Physical    General Appearance: Alert, pleasant, uncomfortable and lying on exam table but no distress, AVSS  Head: Normocephalic, without obvious abnormality, atraumatic  Eyes: Conjunctivae are normal.   Lungs: Clear to auscultation bilaterally, respirations unlabored  Heart: Regular rate and rhythm  Abdomen: Soft, non-distended, tender with mild guarding in the LLQ, no masses, no organomegaly, bowel sounds present  Back: low back aching, no CVA tenderness with percussion  Skin: Skin color normal  Psychiatric: Patient has a normal mood and affect.       Results for orders placed or performed during the hospital encounter of 07/28/22   CT Abdomen Pelvis w Contrast     Status: None    Narrative    EXAM: CT ABDOMEN PELVIS W CONTRAST  LOCATION: [facility]  DATE/TIME: 7/28/2022 6:13 PM    INDICATION: worsening LLQ pain, history of diverticulosis  COMPARISON: None.  TECHNIQUE: CT scan of the abdomen and pelvis was performed following injection of IV contrast. Multiplanar reformats were obtained. Dose reduction techniques were used.  CONTRAST: Isovue 370 100mL    FINDINGS:   LOWER CHEST: Normal.    HEPATOBILIARY: Normal.    PANCREAS:  Normal.    SPLEEN: Normal.    ADRENAL GLANDS: Normal.    KIDNEYS/BLADDER: Normal.    BOWEL: Changes of acute diverticulitis involving the lower descending colon. There is a small amount of ill-defined fluid present.    LYMPH NODES: Normal.    VASCULATURE: Unremarkable.    PELVIC ORGANS: Normal.    MUSCULOSKELETAL: Normal.      Impression    IMPRESSION:   1.  Acute diverticulitis lower descending colon in the left lower abdomen with a small amount of surrounding ill-defined fluid.   Results for orders placed or performed in visit on 07/28/22   UA macro with reflex to Microscopic and Culture - Clinc Collect     Status: Normal    Specimen: Urine, Clean Catch   Result Value Ref Range    Color Urine Yellow Colorless, Straw, Light Yellow, Yellow    Appearance Urine Clear Clear    Glucose Urine Negative Negative mg/dL    Bilirubin Urine Negative Negative    Ketones Urine Negative Negative mg/dL    Specific Gravity Urine 1.015 1.005 - 1.030    Blood Urine Negative Negative    pH Urine 7.0 5.0 - 8.0    Protein Albumin Urine Negative Negative mg/dL    Urobilinogen Urine 0.2 0.2, 1.0 E.U./dL    Nitrite Urine Negative Negative    Leukocyte Esterase Urine Negative Negative    Narrative    Microscopic not indicated   CBC with platelets and differential     Status: Abnormal   Result Value Ref Range    WBC Count 16.0 (H) 4.0 - 11.0 10e3/uL    RBC Count 4.13 3.80 - 5.20 10e6/uL    Hemoglobin 13.1 11.7 - 15.7 g/dL    Hematocrit 39.0 35.0 - 47.0 %    MCV 94 78 - 100 fL    MCH 31.7 26.5 - 33.0 pg    MCHC 33.6 31.5 - 36.5 g/dL    RDW 11.5 10.0 - 15.0 %    Platelet Count 337 150 - 450 10e3/uL    % Neutrophils 84 %    % Lymphocytes 7 %    % Monocytes 9 %    % Eosinophils 1 %    % Basophils 0 %    % Immature Granulocytes 0 %    Absolute Neutrophils 13.4 (H) 1.6 - 8.3 10e3/uL    Absolute Lymphocytes 1.1 0.8 - 5.3 10e3/uL    Absolute Monocytes 1.4 (H) 0.0 - 1.3 10e3/uL    Absolute Eosinophils 0.1 0.0 - 0.7 10e3/uL    Absolute Basophils 0.0  0.0 - 0.2 10e3/uL    Absolute Immature Granulocytes 0.0 <=0.4 10e3/uL   HCG Qual, Urine ([id number])     Status: Normal   Result Value Ref Range    hCG Urine Qualitative Negative Negative   CBC with platelets and differential     Status: Abnormal    Narrative    The following orders were created for panel order CBC with platelets and differential.  Procedure                               Abnormality         Status                     ---------                               -----------         ------                     CBC with platelets and d...[[id number]]  Abnormal            Final result                 Please view results for these tests on the individual orders.       This note has been dictated in part using voice recognition software.  Any grammatical or context distortions are unintentional and inherent to the software.  Please feel free to contact me directly for clarification if needed.

## 2022-07-29 NOTE — H&P
[facility] MEDICINE ADMISSION HISTORY AND PHYSICAL     Assessment & Plan      Britney Jenkins is a 41 year old old female with history of recurrent diverticulitis presented with abdominal pain and found to have acute diverticulitis    Acute diverticulitis with a very small fluid collection  Diet as tolerated  Zosyn  Oxycodone for pain control  General surgery consult  Likely home tomorrow      Clinically Significant Risk Factors Present on Admission                            DVTP: Low Risk Patient   Code Status: No Order  Disposition: Observation   Expected LOS( including midnights in ER): 1 day  Expected Discharge Destination: Home   Goals for the hospitalization: Improvement/stability and symptoms, surgical consultation         Disposition Plan      Expected Discharge Date: 07/29/2022, 12:00 PM            The patient's care was discussed with the Patient and Patient's Family.    Kvng Grimes MD  [facility]  Securely message with the Vocera Web Console (learn more here)  Text page via [employer] Paging/Directory         Chief Complaint  abdominal pain   History is obtained from the patient  HISTORY     Britney Jenkisn is a 41 year old old female with PMH documented above p/w left-sided abdominal pain x4 days, worse over the last couple of days with nausea but no vomiting, no diarrhea no fever but has some chills  Had episodes of diverticulitis in the past, versus 1 about 4 years ago that initially did not improve with the Cipro and Flagyl but got better with Augmentin.  Reports that she had at least 4 episodes    Past Medical History     Past Medical History:   Diagnosis Date     Motion sickness      Noninfectious ileitis     Diverticulitis     Other chronic pain     Wrists, hand     Uncomplicated asthma     As a child       Surgical History     Past Surgical History:   Procedure Laterality Date     COLONOSCOPY       DILATION AND CURETTAGE, OPERATIVE  HYSTEROSCOPY, COMBINED N/A 4/14/2022    Procedure: HYSTEROSCOPY, WITH DILATION AND CURETTAGE, ENDOMETRIAL POLYPECTOMY;  Surgeon: Saira Adams DO;  Location: [facility]     RECTAL PROLAPSE REPAIR  1983     WISDOM TOOTH EXTRACTION       Family History    Reviewed, and   Family History   Problem Relation Age of Onset     Cervical Cancer Mother 35     Thyroid Disease Mother 45     Cancer Father 60        LN     Diabetes Maternal Grandmother      Dementia Maternal Grandfather      Dementia Paternal Grandmother         Social History      Social History     Tobacco Use     Smoking status: Never Smoker     Smokeless tobacco: Never Used   Substance Use Topics     Alcohol use: Not Currently     Drug use: No     Allergies   No Known Allergies  Prior to Admission Medications      Prior to Admission Medications   Prescriptions Last Dose Informant Patient Reported? Taking?   CALCIUM PO   Yes No   Cholecalciferol (VITAMIN D3 PO)   Yes No   FIBER PO   Yes No   Multiple Vitamin (MULTIVITAMIN PO)   Yes No   Nutritional Supplements (DHEA PO)   Yes No   biotin 300 mcg Tab   No No   Sig: [BIOTIN 300 MCG TAB] Take 1 tablet by mouth daily.   dextroamphetamine-amphetamine (ADDERALL XR) 20 MG 24 hr capsule   Yes No   Sig: [DEXTROAMPHETAMINE-AMPHETAMINE (ADDERALL XR) 20 MG 24 HR CAPSULE] TAKE ONE CAPSULE BY MOUTH EVERY DAY   ibuprofen (ADVIL/MOTRIN) 800 MG tablet   No No   Sig: Take 1 tablet (800 mg) by mouth every 6 hours as needed for other (mild and/or inflammatory pain)   progesterone (PROMETRIUM) 200 MG capsule   Yes No   Sig: TAKE 1 CAPSULE BY MOUTH AT BEDTIME   spironolactone (ALDACTONE) 100 MG tablet   Yes No   Sig: Take 100 mg by mouth in the morning.      Facility-Administered Medications: None      Review of Systems     A 12 point comprehensive review of systems was negative except as noted above in HPI.    PHYSICAL EXAMINATION       Vitals      Temp:  [98  F (36.7  C)-98.5  F (36.9  C)] 98  F (36.7  C)  Pulse:   [76-86] 76  Resp:  [16] 16  BP: (100-104)/(61-71) 100/61  SpO2:  [97 %-98 %] 98 %  Wt Readings from Last 2 Encounters:   07/28/22 58.1 kg (128 lb)   07/28/22 58.1 kg (128 lb)         Examination     General Appearance:  Alert, cooperative, no distress, appears stated age   Head:  Normocephalic, without obvious abnormality, atraumatic   Eyes:  PERRL, conjunctiva/corneas clear, EOM's intact   Nose: Nares normal, septum midline, mucosa normal, no drainage   Throat: Lips, mucosa, and tongue normal; teeth and gums normal   Neck: Supple, symmetrical, trachea midline, no adenopathy, thyroid: not enlarged, symmetric, no carotid bruit or JVD   Back:   Symmetric, no curvature, ROM normal, no CVA tenderness   Lungs:   Clear to auscultation bilaterally, respirations unlabored   Chest Wall:  No tenderness or deformity   Heart:  Regular rate and rhythm, S1, S2 normal,no murmur, rub or gallop   Abdomen:    Soft with a left-sided abdominal tenderness, bowel sounds positive   Extremities: Extremities normal, atraumatic, no cyanosis or edema   Skin: Skin color, texture, turgor normal, no rashes or lesions on the visible parts of the skin ( full body skin exam was not conducted)   Neurologic: Alert and oriented X 3, Moves all 4 extremities     Pertinent Lab and Radiology from this Visit      Personally reviewed.  Recent Labs   Lab 07/28/22  2019 07/28/22  1748   WBC 15.1* 16.0*   HGB 12.8 13.1   MCV 94 94    337   *  --    POTASSIUM 3.6  --    CHLORIDE 100  --    CO2 24  --    BUN 8  --    CR 0.76  --    ANIONGAP 11  --    YOSELIN 9.4  --    GLC 78  --    ALBUMIN 4.0  --    PROTTOTAL 7.6  --    BILITOTAL 0.7  --    ALKPHOS 50  --    ALT 16  --    AST 16  --      Recent Results (from the past 24 hour(s))   CT Abdomen Pelvis w Contrast    Narrative    EXAM: CT ABDOMEN PELVIS W CONTRAST  LOCATION: [facility]  DATE/TIME: 7/28/2022 6:13 PM    INDICATION: worsening LLQ pain, history of  diverticulosis  COMPARISON: None.  TECHNIQUE: CT scan of the abdomen and pelvis was performed following injection of IV contrast. Multiplanar reformats were obtained. Dose reduction techniques were used.  CONTRAST: Isovue 370 100mL    FINDINGS:   LOWER CHEST: Normal.    HEPATOBILIARY: Normal.    PANCREAS: Normal.    SPLEEN: Normal.    ADRENAL GLANDS: Normal.    KIDNEYS/BLADDER: Normal.    BOWEL: Changes of acute diverticulitis involving the lower descending colon. There is a small amount of ill-defined fluid present.    LYMPH NODES: Normal.    VASCULATURE: Unremarkable.    PELVIC ORGANS: Normal.    MUSCULOSKELETAL: Normal.      Impression    IMPRESSION:   1.  Acute diverticulitis lower descending colon in the left lower abdomen with a small amount of surrounding ill-defined fluid.         Cardiology      EKG Results:   Echocardiogram   [unfilled]         Kvng MD [place]

## 2022-07-29 NOTE — PROGRESS NOTES
S Daily Progress Note    Assessment/Plan:  41-year-old female with recurrent diverticulitis presenting with uncomplicated acute diverticulitis confirmed on CT scan on 7/28/2022.  Patient is attempting advancement in diet.  Complains of headache.    Acute diverticulitis.  Recurrent.  Discussed with general surgery and Dr. Allen.  Plan to follow-up with patient is outpatient in clinic.  Patient would likely benefit from sigmoidectomy due to recurrent diverticulitis.  Defer need for colonoscopy or sigmoidoscopy to general surgery follow-up.  Continue Zosyn 3.375 g IV every 8 hours.  Advance diet today.  If patient is tolerating diet she wishes to discharge to home which I think is reasonable.  Will continue 10-day course of antibiotics (augmentin).    Headache  General headache no red flag symptoms.  Likely associated with rebound effect from IV pain medication and oral pain medication.  Patient also is a caffeine drinker  Order Fioricet with caffeine every 4 hours as needed  Order ketorolac 30 mg IV every 6 hours as needed for moderate to severe headache    Diet: Advance Diet as Tolerated: Regular Diet Adult  DVT Prophylaxis:  Low Risk/Ambulatory with no VTE prophylaxis indicated  Code Status: Full Code    Active Problems:    Acute diverticulitis    Diverticulitis of colon     LOS: 0 days     Barriers to discharge: Advancing diet, headache, nausea.  Discharge Disposition: Home    Subjective:  Britney reports left lower quadrant abdominal discomfort which is improved since admission.  She complains of anorexia and some nausea overnight.  She is willing to attempt advancement in diet today.  Denies fever or chills.      piperacillin-tazobactam  3.375 g Intravenous Q8H       Objective:  Vital signs in last 24 hours:  Temp:  [98  F (36.7  C)-98.5  F (36.9  C)] 98  F (36.7  C)  Pulse:  [76-86] 86  Resp:  [16-18] 18  BP: ()/(54-71) 97/56  SpO2:  [95 %-98 %] 96 %  Weight:   Weight:   @THISENCWEIGHTS(1)@  Weight  change:   Body mass index is 23.62 kg/m .    Intake/Output last 3 shifts:  No intake/output data recorded.  Intake/Output this shift:  I/O this shift:  In: 120 [P.O.:120]  Out: -     Review of Systems:   As per subjective, all others negative.    Physical Exam:    GENERAL:  Alert, appears comfortable, in no acute distress, appears stated age   HEAD:  Normocephalic, without obvious abnormality, atraumatic   EYES:  PERRL, conjunctiva/corneas clear, no scleral icterus, EOM's intact   NOSE: Nares normal, septum midline, mucosa normal, no drainage   THROAT: Lips, mucosa, and tongue normal; teeth and gums normal, mouth moist   NECK: Supple, symmetrical, trachea midline   BACK:   Symmetric, no curvature, ROM normal   LUNGS:   Clear to auscultation bilaterally, no rales, rhonchi, or wheezing, symmetric chest rise on inhalation, respirations unlabored   CHEST WALL:  No tenderness or deformity   HEART:  Regular rate and rhythm, S1 and S2 normal, no murmur, rub, or gallop    ABDOMEN:   Soft, left lower quadrant tenderness, bowel sounds active all four quadrants, no masses, no organomegaly, no rebound or guarding   EXTREMITIES: Extremities normal, atraumatic, no cyanosis or edema    SKIN: Dry to touch, no exanthems in the visualized areas   NEURO: Alert, oriented x3, moves all four extremities freely, non-focal   PSYCH: Cooperative, behavior is appropriate      Imaging:  Personally Reviewed.  No results found for this visit on 07/28/22.    Lab Results:  Personally Reviewed.   Recent Labs   Lab 07/29/22  0649 07/28/22 2019 07/28/22  1748   WBC 14.1* 15.1* 16.0*   HGB 11.5* 12.8 13.1   HCT 34.8* 38.0 39.0    319 337     Recent Labs   Lab 07/29/22  0649 07/28/22 2019   * 135*   CO2 30 24   BUN 7* 8   ALBUMIN  --  4.0   ALKPHOS  --  50   ALT  --  16   AST  --  16     No results for input(s): INR in the last 168 hours.    I reviewed all labs and imaging studies as of this date and I reviewed all current inpatient  medications and updated them    Cam Friedman DO, MS  [facility]  Internal Medicine

## 2022-07-29 NOTE — ED TRIAGE NOTES
Patient is here with diverticulitis. She was told that she is to have IV antibiotics. Patient does have abdomen pain. She has had this in the past. Their is perforation of her bowel.

## 2022-07-29 NOTE — DISCHARGE SUMMARY
[facility] MEDICINE  DISCHARGE SUMMARY     Primary Care Physician: Niru Crawley  Admission Date: 7/28/2022   Discharge Provider: Cam Friedman DO Discharge Date: 7/29/2022   Diet:   Active Diet and Nourishment Order   Procedures     Advance Diet as Tolerated: Regular Diet Adult     Diet       Code Status: Full Code   Activity: DCACTIVITY: Activity as tolerated        Condition at Discharge: Stable     REASON FOR PRESENTATION(See Admission Note for Details)   Abdominal pain, acute uncomplicated recurrent diverticulitis.     PRINCIPAL & ACTIVE DISCHARGE DIAGNOSES     Active Problems:    Acute diverticulitis    Diverticulitis of colon    Episodic Tension-type headache, not intractable.       PENDING LABS     Unresulted Labs Ordered in the Past 30 Days of this Admission     Date and Time Order Name Status Description    7/28/2022  8:13 PM Blood Culture Peripheral Blood Preliminary     7/28/2022  8:13 PM Blood Culture Peripheral Blood Preliminary         PROCEDURES ( this hospitalization only)      RECOMMENDATIONS TO OUTPATIENT PROVIDER FOR F/U VISIT     Follow-up Appointments     Follow-up and recommended labs and tests       Follow up with primary care provider, Niru Crawley, within 7 days for   uncomplicated diverticulitis.  The following labs/tests are recommended:   CBC.    Follow up with Dr. Beauchamp, at [facility] clinic, for   hospital follow- up and regarding new diagnosis recurrent diverticulitis.             DISPOSITION     Home    SUMMARY OF HOSPITAL COURSE:      41-year-old female with recurrent diverticulitis presenting with uncomplicated acute diverticulitis confirmed on CT scan on 7/28/2022 with small amount of surrounding ill-defined fluid around lower descending colon likely phlegmon.  Patient was treated with Zosyn IV.  Patient tolerated insulin diet on 7/29/2022.  Discussed case with Dr. Decekr from [facility] general surgery  service and recommended outpatient follow-up in his clinic for recurrent diverticulitis.  Patient would likely benefit from sigmoidectomy due to recurrent diverticulitis.  Britney was tolerating diet had minimal left lower quadrant abdominal discomfort and no fever at time of discharge.    Patient had incidental generalized headache with no red flag symptoms on 7/29.  She received ketorolac with significant improvement in headache pain.    Patient was discharged with 10-day course of Augmentin and recommendation to follow-up with primary care in 1 week to ensure resolution of symptoms.    Discharge Medications with Med changes:     Current Discharge Medication List      START taking these medications    Details   amoxicillin-clavulanate (AUGMENTIN) 875-125 MG tablet Take 1 tablet by mouth 2 times daily  Qty: 20 tablet, Refills: 0    Associated Diagnoses: Acute diverticulitis      ondansetron (ZOFRAN ODT) 4 MG ODT tab Take 1 tablet (4 mg) by mouth every 6 hours as needed for nausea or vomiting  Qty: 10 tablet, Refills: 0    Associated Diagnoses: Acute diverticulitis         CONTINUE these medications which have NOT CHANGED    Details   calcium carbonate (OS-YOSELIN) 500 MG tablet Take 1 tablet by mouth daily      dextroamphetamine-amphetamine (ADDERALL XR) 20 MG 24 hr capsule [DEXTROAMPHETAMINE-AMPHETAMINE (ADDERALL XR) 20 MG 24 HR CAPSULE] TAKE ONE CAPSULE BY MOUTH EVERY DAY  Refills: 0      ferrous sulfate (FEROSUL) 325 (65 Fe) MG tablet Take 325 mg by mouth daily (with breakfast)      ibuprofen (ADVIL/MOTRIN) 200 MG tablet Take 400 mg by mouth every 6 hours as needed for mild pain      multivitamin, therapeutic (THERA-VIT) TABS tablet Take 1 tablet by mouth daily      Nutritional Supplements (DHEA PO) Take 1 capsule by mouth daily      progesterone (PROMETRIUM) 200 MG capsule Take 200 mg by mouth At Bedtime      psyllium (METAMUCIL/KONSYL) capsule Take 5 capsules by mouth 2 times daily      spironolactone (ALDACTONE)  100 MG tablet Take 100 mg by mouth At Bedtime      Vitamin D3 (VITAMIN D) 10 MCG (400 UNIT) tablet Take 1,200 Units by mouth daily                   Rationale for medication changes:      Augmentin, ondansetron 4 acute diverticulitis, nausea respectively.    Consults     None    Immunizations given this encounter     Most Recent Immunizations   Administered Date(s) Administered     COVID-19,PF,Jeanna 04/04/2021     Tdap (Adacel,Boostrix) 10/10/2010           Anticoagulation Information      Recent INR results: No results for input(s): INR in the last 168 hours.  Warfarin doses (if applicable) or name of other anticoagulant: N/A      SIGNIFICANT IMAGING FINDINGS     EXAM: CT ABDOMEN PELVIS W CONTRAST  LOCATION: [facility]  DATE/TIME: 7/28/2022 6:13 PM     INDICATION: worsening LLQ pain, history of diverticulosis  COMPARISON: None.  TECHNIQUE: CT scan of the abdomen and pelvis was performed following injection of IV contrast. Multiplanar reformats were obtained. Dose reduction techniques were used.  CONTRAST: Isovue 370 100mL                                                                    IMPRESSION:   1.  Acute diverticulitis lower descending colon in the left lower abdomen with a small amount of surrounding ill-defined fluid.    SIGNIFICANT LABORATORY FINDINGS     Most Recent 3 CBC's:Recent Labs   Lab Test 07/29/22  0649 07/28/22  2019 07/28/22  1748   WBC 14.1* 15.1* 16.0*   HGB 11.5* 12.8 13.1   MCV 94 94 94    319 337     Most Recent 3 BMP's:Recent Labs   Lab Test 07/29/22  0649 07/28/22  2019 04/11/22  0953   * 135* 139   POTASSIUM 3.8 3.6 4.3   CHLORIDE 102 100 104   CO2 30 24 25   BUN 7* 8 11   CR 0.83 0.76 0.84   ANIONGAP 3* 11 10   YOSELIN 8.8 9.4 10.0   GLC 94 78 81     Most Recent 2 LFT's:Recent Labs   Lab Test 07/28/22  2019 04/09/18  1818   AST 16 21   ALT 16 29   ALKPHOS 50 46   BILITOTAL 0.7 0.6     Discharge Orders        Adult General Surg Referral      Reason  for your hospital stay    Left abdominal pain, acute uncomplicated diverticulitis.     Follow-up and recommended labs and tests     Follow up with primary care provider, Niru Crawley, within 7 days for uncomplicated diverticulitis.  The following labs/tests are recommended: CBC.    Follow up with Dr. Beauchamp, at [facility] Surgery clinic, for hospital follow- up and regarding new diagnosis recurrent diverticulitis.     Activity    Your activity upon discharge: activity as tolerated     When to contact your care team    Call your primary doctor if you have any of the following: temperature greater than 100.4 degrees Fahrenheit, increased swelling, or increased pain.     Diet    Follow this diet upon discharge: Orders Placed This Encounter      Advance Diet as Tolerated: Regular Diet Adult       Examination   Physical Exam   Temp:  [98  F (36.7  C)-98.5  F (36.9  C)] 98.3  F (36.8  C)  Pulse:  [72-86] 72  Resp:  [16-18] 18  BP: ()/(54-71) 107/60  SpO2:  [95 %-98 %] 97 %  Wt Readings from Last 1 Encounters:   07/28/22 58.1 kg (128 lb)       GENERAL:  Alert, appears comfortable, in no acute distress, appears stated age   HEAD:  Normocephalic, without obvious abnormality, atraumatic   EYES:  PERRL, conjunctiva/corneas clear, no scleral icterus, EOM's intact   NOSE: Nares normal, septum midline, mucosa normal, no drainage   THROAT: Lips, mucosa, and tongue normal; teeth and gums normal, mouth moist   NECK: Supple, symmetrical, trachea midline   BACK:   Symmetric, no curvature, ROM normal   LUNGS:   Clear to auscultation bilaterally, no rales, rhonchi, or wheezing, symmetric chest rise on inhalation, respirations unlabored   CHEST WALL:  No tenderness or deformity   HEART:  Regular rate and rhythm, S1 and S2 normal, no murmur, rub, or gallop    ABDOMEN:   Soft, left lower quadrant tenderness, bowel sounds active all four quadrants, no masses, no organomegaly, no rebound or guarding   EXTREMITIES:  Extremities normal, atraumatic, no cyanosis or edema    SKIN: Dry to touch, no exanthems in the visualized areas   NEURO: Alert, oriented x3, moves all four extremities freely, non-focal   PSYCH: Cooperative, behavior is appropriate        Please see EMR for more detailed significant labs, imaging, consultant notes etc.    Cam MONTEJO DO, personally saw the patient today and spent greater than 30 minutes discharging this patient.    Cam Friedman DO  [facility]    CC:Niru Crawley

## 2022-07-29 NOTE — PROGRESS NOTES
"Vss. A&O. Pain rated 3 on 0-10 pain scale r/t lower quadrant abd pressure and headache. Will continue to monitor.   PRIMARY DIAGNOSIS: \"GENERIC\" NURSING  OUTPATIENT/OBSERVATION GOALS TO BE MET BEFORE DISCHARGE:  1. ADLs back to baseline: Yes    2. Activity and level of assistance: Ambulating independently.    3. Pain status: Pain controled with PO medication     4. Return to near baseline physical activity: No     Discharge Planner Nurse   Safe discharge environment identified: Yes  Barriers to discharge: Yes (medical clearance)        Entered by: Jeanie Vaughn RN 07/29/2022 8:59 AM     Please review provider order for any additional goals.   Nurse to notify provider when observation goals have been met and patient is ready for discharge.  "

## 2022-07-29 NOTE — PHARMACY-ADMISSION MEDICATION HISTORY
Pharmacy Note - Admission Medication History    Pertinent Provider Information: n/a     ______________________________________________________________________    Prior To Admission (PTA) med list completed and updated in EMR.       PTA Med List   Medication Sig Last Dose     calcium carbonate (OS-YOSELIN) 500 MG tablet Take 1 tablet by mouth daily 7/28/2022 at Unknown time     dextroamphetamine-amphetamine (ADDERALL XR) 20 MG 24 hr capsule [DEXTROAMPHETAMINE-AMPHETAMINE (ADDERALL XR) 20 MG 24 HR CAPSULE] TAKE ONE CAPSULE BY MOUTH EVERY DAY 7/28/2022 at Unknown time     ferrous sulfate (FEROSUL) 325 (65 Fe) MG tablet Take 325 mg by mouth daily (with breakfast) 7/28/2022 at Unknown time     ibuprofen (ADVIL/MOTRIN) 200 MG tablet Take 400 mg by mouth every 6 hours as needed for mild pain 7/28/2022 at am     multivitamin, therapeutic (THERA-VIT) TABS tablet Take 1 tablet by mouth daily 7/28/2022 at Unknown time     Nutritional Supplements (DHEA PO) Take 1 capsule by mouth daily 7/28/2022 at Unknown time     progesterone (PROMETRIUM) 200 MG capsule Take 200 mg by mouth At Bedtime 7/27/2022 at Unknown time     psyllium (METAMUCIL/KONSYL) capsule Take 5 capsules by mouth 2 times daily 7/28/2022 at am     spironolactone (ALDACTONE) 100 MG tablet Take 100 mg by mouth At Bedtime 7/27/2022 at Unknown time     Vitamin D3 (VITAMIN D) 10 MCG (400 UNIT) tablet Take 1,200 Units by mouth daily 7/28/2022 at Unknown time       Information source(s): [facility]  Method of interview communication: in-person    Summary of Changes to PTA Med List  New: iron  Discontinued: biotin  Changed: updated metamucil dosing; updated ibuprofen dosing    Patient was asked about OTC/herbal products specifically.  PTA med list reflects this.    In the past week, patient estimated taking medication this percent of the time:  greater than 90%.    Allergies were reviewed, assessed, and updated with the patient.      Patient does not  use any multi-dose medications prior to admission.    The information provided in this note is only as accurate as the sources available at the time of the update(s).    Thank you for the opportunity to participate in the care of this patient.    Cristal Pitt [facility]  7/28/2022 9:40 PM

## 2022-07-29 NOTE — PLAN OF CARE
"Vss. A&O. Pain rated between 1-3 on 0-10 pain scale r/t lower quadrant abd pain and headache. Ibuprofen and PRN toradol given to address. Pt adequate for discharge. AVS discussed with pt and all questions answered. Verbalized understanding. PIV removed. All belongings packed. Pt discharging home with family.     PRIMARY DIAGNOSIS: \"GENERIC\" NURSING  OUTPATIENT/OBSERVATION GOALS TO BE MET BEFORE DISCHARGE:  ADLs back to baseline: Yes    Activity and level of assistance: Ambulating independently.    Pain status: Improved-controlled with oral pain medications.    Return to near baseline physical activity: Yes     Discharge Planner Nurse   Safe discharge environment identified: Yes  Barriers to discharge: No       Entered by: Jeanie Vaughn RN 07/29/2022 2:02 PM     Please review provider order for any additional goals.   Nurse to notify provider when observation goals have been met and patient is ready for discharge.    "

## 2022-07-29 NOTE — ED PROVIDER NOTES
"EMERGENCY DEPARTMENT ENCOUNTER      NAME: Britney Jenkins  AGE: 41 year old female  YOB: 1980  MRN: [id number]  EVALUATION DATE & TIME: 7/28/2022  7:51 PM    PCP: Niru Crawley    ED PROVIDER: Chrissie Osborne M.D.      Chief Complaint   Patient presents with     Abdominal Pain         FINAL IMPRESSION:  1. Diverticulitis of colon          ED COURSE & MEDICAL DECISION MAKING:    ED Course as of 07/28/22 2044   Thu Jul 28, 2022 2026 Patient with recurrent diverticulitis with pain 2 days with CT revealing ill defined surrounding fluid, I spoke with surgeon Dr Erickson who recommends IV abx, bowel rest and she will see patient while admitted. Hospitalist paged for admission.   2043 Patient endorsed to hospitalist Dr Allan to med surg obs       8:28 PM I met with the patient, obtained history, performed an initial exam, and discussed options and plan for diagnostics and treatment here in the ED. PPE worn including surgical mask, surgical gloves.    Pertinent Labs & Imaging studies reviewed. (See chart for details)      At the conclusion of the encounter I discussed the results of all of the tests and the disposition. The questions were answered. The patient or family acknowledged understanding and was agreeable with the care plan.     MEDICATIONS GIVEN IN THE EMERGENCY:  Medications   morphine (PF) injection 4 mg (has no administration in time range)   piperacillin-tazobactam (ZOSYN) 3.375 g vial to attach to  mL bag (has no administration in time range)       NEW PRESCRIPTIONS STARTED AT TODAY'S ER VISIT  New Prescriptions    No medications on file          =================================================================    HPI      Britney Jenkins is a 41 year old female with PMHx of recurrent diverticulitis who presents to the ED today via private vehicle with abdominal pain. Patient endorses 2 days of LLQ abdominal pain described as being \"sore\" radiating into her left lower back. She states her " pain is similar to her prior episodes of diverticulitis. Pain is improved with ibuprofen. Patient saw her PCP earlier today and had an outpatient CT scan which revealed diverticulitis with a small amount of free fluid in the region and was then referred to the ED.    Patient endorses associated nausea, but denies vomiting. Patient also reports feeling hot and cold, but denies fevers.    Per chart review, CT abdomen and pelvis with contrast resulted with acute diverticulitis lower descending colon in the left lower abdomen with a small amount of surrounding ill-defined fluid.    REVIEW OF SYSTEMS   All other systems reviewed and are negative except as noted above in HPI.    PAST MEDICAL HISTORY:  Past Medical History:   Diagnosis Date     Motion sickness      Noninfectious ileitis     Diverticulitis     Other chronic pain     Wrists, hand     Uncomplicated asthma     As a child       PAST SURGICAL HISTORY:  Past Surgical History:   Procedure Laterality Date     COLONOSCOPY       DILATION AND CURETTAGE, OPERATIVE HYSTEROSCOPY, COMBINED N/A 4/14/2022    Procedure: HYSTEROSCOPY, WITH DILATION AND CURETTAGE, ENDOMETRIAL POLYPECTOMY;  Surgeon: Saira Adams DO;  Location: [facility]     RECTAL PROLAPSE REPAIR  1983     WISDOM TOOTH EXTRACTION         CURRENT MEDICATIONS:    biotin 300 mcg Tab  CALCIUM PO  Cholecalciferol (VITAMIN D3 PO)  dextroamphetamine-amphetamine (ADDERALL XR) 20 MG 24 hr capsule  FIBER PO  ibuprofen (ADVIL/MOTRIN) 800 MG tablet  Multiple Vitamin (MULTIVITAMIN PO)  Nutritional Supplements (DHEA PO)  progesterone (PROMETRIUM) 200 MG capsule  spironolactone (ALDACTONE) 100 MG tablet        ALLERGIES:  No Known Allergies    FAMILY HISTORY:  Family History   Problem Relation Age of Onset     Cervical Cancer Mother 35     Thyroid Disease Mother 45     Cancer Father 60        LN     Diabetes Maternal Grandmother      Dementia Maternal Grandfather      Dementia Paternal Grandmother         SOCIAL HISTORY:   Social History     Socioeconomic History     Marital status:      Number of children: 2   Tobacco Use     Smoking status: Never Smoker     Smokeless tobacco: Never Used   Substance and Sexual Activity     Alcohol use: Not Currently     Drug use: No     Sexual activity: Yes     Partners: Male     Birth control/protection: OCP   Social History Narrative        2 Children       VITALS:  Patient Vitals for the past 24 hrs:   BP Temp Temp src Pulse Resp SpO2 Weight   07/28/22 1942 100/61 98  F (36.7  C) Temporal 76 16 98 % 58.1 kg (128 lb)       PHYSICAL EXAM    GENERAL: Awake, alert.  In no acute distress.   HEENT: Normocephalic, atraumatic.  Pupils equal, round and reactive.  Conjunctiva normal.  EOMI.  NECK: No stridor or apparent deformity.  PULMONARY: Symmetrical breath sounds without distress.  Lungs clear to auscultation bilaterally without wheezes, rhonchi or rales.  CARDIO: Regular rate and rhythm.  No significant murmur, rub or gallop.  Radial pulses strong and symmetrical.  ABDOMINAL: Abdomen soft, non-distended.  No CVAT, no palpable hepatosplenomegaly. LLQ tenderness without rebound or guarding.  EXTREMITIES: No lower extremity swelling or edema.    NEURO: Alert and oriented to person, place and time.  Cranial nerves grossly intact.  No focal motor deficit.  PSYCH: Normal mood and affect  SKIN: No rashes      LAB:  All pertinent labs reviewed and interpreted.  Results for orders placed or performed during the hospital encounter of 07/28/22   CBC with platelets and differential   Result Value Ref Range    WBC Count 15.1 (H) 4.0 - 11.0 10e3/uL    RBC Count 4.06 3.80 - 5.20 10e6/uL    Hemoglobin 12.8 11.7 - 15.7 g/dL    Hematocrit 38.0 35.0 - 47.0 %    MCV 94 78 - 100 fL    MCH 31.5 26.5 - 33.0 pg    MCHC 33.7 31.5 - 36.5 g/dL    RDW 11.6 10.0 - 15.0 %    Platelet Count 319 150 - 450 10e3/uL    % Neutrophils 83 %    % Lymphocytes 7 %    % Monocytes 8 %    %  Eosinophils 1 %    % Basophils 0 %    % Immature Granulocytes 1 %    NRBCs per 100 WBC 0 <1 /100    Absolute Neutrophils 12.8 (H) 1.6 - 8.3 10e3/uL    Absolute Lymphocytes 1.0 0.8 - 5.3 10e3/uL    Absolute Monocytes 1.2 0.0 - 1.3 10e3/uL    Absolute Eosinophils 0.1 0.0 - 0.7 10e3/uL    Absolute Basophils 0.0 0.0 - 0.2 10e3/uL    Absolute Immature Granulocytes 0.1 <=0.4 10e3/uL    Absolute NRBCs 0.0 10e3/uL             I, Ignacio Arteaga, am serving as a scribe to document services personally performed by Dr. Chrissie Osborne based on my observation and the provider's statements to me. I, Chrissie Osborne MD attest that Ignacio Arteaga is acting in a scribe capacity, has observed my performance of the services and has documented them in accordance with my direction.     Chrissie Osborne MD  07/28/22 2044

## 2022-07-30 PROCEDURE — G0378 HOSPITAL OBSERVATION PER HR: HCPCS

## 2022-08-02 LAB
BACTERIA BLD CULT: NO GROWTH
BACTERIA BLD CULT: NO GROWTH

## 2022-10-24 ENCOUNTER — IMMUNIZATION (OUTPATIENT)
Dept: NURSING | Facility: CLINIC | Age: 42
End: 2022-10-24
Payer: COMMERCIAL

## 2022-10-24 PROCEDURE — 91312 COVID-19,PF,PFIZER BOOSTER BIVALENT: CPT

## 2022-10-24 PROCEDURE — 0124A COVID-19,PF,PFIZER BOOSTER BIVALENT: CPT

## 2023-04-07 ENCOUNTER — VIRTUAL VISIT (OUTPATIENT)
Dept: FAMILY MEDICINE | Facility: CLINIC | Age: 43
End: 2023-04-07
Payer: COMMERCIAL

## 2023-04-07 ENCOUNTER — TELEPHONE (OUTPATIENT)
Dept: FAMILY MEDICINE | Facility: CLINIC | Age: 43
End: 2023-04-07

## 2023-04-07 DIAGNOSIS — R05.1 ACUTE COUGH: ICD-10-CM

## 2023-04-07 DIAGNOSIS — J32.9 BACTERIAL SINUSITIS: Primary | ICD-10-CM

## 2023-04-07 DIAGNOSIS — B96.89 BACTERIAL SINUSITIS: Primary | ICD-10-CM

## 2023-04-07 PROCEDURE — 99213 OFFICE O/P EST LOW 20 MIN: CPT | Mod: VID

## 2023-04-07 RX ORDER — LEVALBUTEROL TARTRATE 45 UG/1
2 AEROSOL, METERED ORAL EVERY 4 HOURS PRN
Qty: 15 G | Refills: 0 | Status: SHIPPED | OUTPATIENT
Start: 2023-04-07

## 2023-04-07 ASSESSMENT — ENCOUNTER SYMPTOMS
SORE THROAT: 1
SHORTNESS OF BREATH: 0
HEADACHES: 1
FEVER: 0
CHILLS: 1
SINUS PRESSURE: 1
COUGH: 1
CHEST TIGHTNESS: 0
SINUS PAIN: 1
FATIGUE: 1

## 2023-04-07 NOTE — ASSESSMENT & PLAN NOTE
Discussed possible etiologies behind her symptoms including long COVID versus bacterial infection. With the duration of her symptoms being extended, will treat for presumed sinus infection with augmentin x7 days, but we discussed it's not the typical presentation. Expected therapeutic effects and potential side effects discussed. Also discussed supportive cares such as OTC analgesics, flonase spray, humidification and steam showers. Cough is bothersome to patient but not productive; will trial LIYAH.  Patient should follow up for in person assessment, specifically for a lung exam/potential chest x-ray, if symptoms persist. Patient expressed an understanding of and agreement with this plan. All questions were answered.

## 2023-04-07 NOTE — PROGRESS NOTES
Britney is a 42 year old who is being evaluated via a billable video visit.      How would you like to obtain your AVS? MyChart  If the video visit is dropped, the invitation should be resent by: Send to e-mail at: [email or web address]  Will anyone else be joining your video visit? No    Assessment & Plan   Problem List Items Addressed This Visit        Respiratory    Bacterial sinusitis - Primary     Discussed possible etiologies behind her symptoms including long COVID versus bacterial infection. With the duration of her symptoms being extended, will treat for presumed sinus infection with augmentin x7 days, but we discussed it's not the typical presentation. Expected therapeutic effects and potential side effects discussed. Also discussed supportive cares such as OTC analgesics, flonase spray, humidification and steam showers. Cough is bothersome to patient but not productive; will trial LIYAH.  Patient should follow up for in person assessment, specifically for a lung exam/potential chest x-ray, if symptoms persist. Patient expressed an understanding of and agreement with this plan. All questions were answered.         Relevant Medications    amoxicillin-clavulanate (AUGMENTIN) 875-125 MG tablet    levalbuterol (XOPENEX HFA) 45 MCG/ACT inhaler    Acute cough    Relevant Medications    levalbuterol (XOPENEX HFA) 45 MCG/ACT inhaler      LANI Hampton CNP  [facility]   Britney is a 42 year old, presenting for the following health issues:    Sinus Problem (Had Covid a just over a month ago.  Had cough and congestion since.  The nasal passage has pressure and she has had Has, feel it is from this congestion.)        4/7/2023    10:15 AM   Additional Questions   Roomed by Jessy DURHAM     Reports COVID infection approximately one month ago. Has been feeling ill since this.   Has been having ongoing headaches, sinus and nasal congestion. This acutely worsened yesterday,  prompting her visit.  Also has a cough. Is dry and barky.  Has had some sinus infections in the past, but not necessarily recurrent.  Ibuprofen for symptoms. No other cares.  No recent antibiotic use    Sinus Problem   Associated symptoms include chills, congestion, sinus pressure, sore throat and cough (dry cough). Pertinent negatives include no ear pain and no shortness of breath.   History of Present Illness       Reason for visit:  Sinus infection    She eats 2-3 servings of fruits and vegetables daily.She consumes 2 sweetened beverage(s) daily.She exercises with enough effort to increase her heart rate 20 to 29 minutes per day.  She exercises with enough effort to increase her heart rate 4 days per week.   She is taking medications regularly.      Review of Systems   Constitutional: Positive for chills and fatigue. Negative for fever.   HENT: Positive for congestion, sinus pressure, sinus pain and sore throat. Negative for ear pain.    Respiratory: Positive for cough (dry cough). Negative for chest tightness and shortness of breath.    Neurological: Positive for headaches.          Objective           Vitals:  No vitals were obtained today due to virtual visit.    Physical Exam   GENERAL: Healthy, alert and no distress  EYES: Eyes grossly normal to inspection.  No discharge or erythema, or obvious scleral/conjunctival abnormalities.  RESP: No audible wheeze, cough, or visible cyanosis.  No visible retractions or increased work of breathing. Dry cough infrequently.    SKIN: Visible skin clear. No significant rash, abnormal pigmentation or lesions.  NEURO: Cranial nerves grossly intact.  Mentation and speech appropriate for age.  PSYCH: Mentation appears normal, affect normal/bright, judgement and insight intact, normal speech and appearance well-groomed.          Video-Visit Details    Type of service:  Video Visit   Video Start Time: 10:15  Video End Time:10:36 AM    Originating Location (pt. Location): Home    Distant Location (provider location):  On-site  Platform used for Video Visit: Brandee

## 2023-05-13 ENCOUNTER — HEALTH MAINTENANCE LETTER (OUTPATIENT)
Age: 43
End: 2023-05-13

## 2023-12-06 ENCOUNTER — NURSE TRIAGE (OUTPATIENT)
Dept: NURSING | Facility: CLINIC | Age: 43
End: 2023-12-06
Payer: COMMERCIAL

## 2023-12-06 NOTE — TELEPHONE ENCOUNTER
Pt is phoning stating that she tested positive for COVID yesterday 12/05/2023    Pt is having headache, sore throat, congestion, cough, runny nose, body aches and tired     Pts symptoms started on Monday 12/04/2023    Temperature 99.0 orally     Pt states that she is having shortness of breath at rest - states that her breathing is more difficult at rest then prior to having COVID     Per disposition: Go to ED Now     Pt refuses disposition and states that she will write to her provider to try an obtain Paxlovid     Care advice given per protocol and when to call back. Pt verbalized understanding and agrees to plan of care.    Amalia Kang RN  [facility] Nurse Advisor  2:40 PM 12/6/2023        Reason for Disposition   MODERATE difficulty breathing (e.g., speaks in phrases, SOB even at rest, pulse 100-120)    Additional Information   Negative: SEVERE difficulty breathing (e.g., struggling for each breath, speaks in single words)   Negative: Difficult to awaken or acting confused (e.g., disoriented, slurred speech)   Negative: Bluish (or gray) lips or face now   Negative: Shock suspected (e.g., cold/pale/clammy skin, too weak to stand, low BP, rapid pulse)   Negative: Sounds like a life-threatening emergency to the triager   Negative: Diagnosed or suspected COVID-19 and symptoms lasting 3 or more weeks   Negative: COVID-19 exposure and no symptoms   Negative: COVID-19 vaccine reaction suspected (e.g., fever, headache, muscle aches) occurring 1 to 3 days after getting vaccine   Negative: COVID-19 vaccine, questions about   Negative: Lives with someone known to have influenza (flu test positive) and flu-like symptoms (e.g., cough, runny nose, sore throat, SOB; with or without fever)   Negative: Possible COVID-19 symptoms and triager concerned about severity of symptoms or other causes   Negative: COVID-19 and breastfeeding, questions about   Negative: SEVERE or constant chest pain or pressure  (Exception: Mild  central chest pain, present only when coughing.)    Protocols used: Coronavirus (COVID-19) Diagnosed or [id number]

## 2024-07-20 ENCOUNTER — HEALTH MAINTENANCE LETTER (OUTPATIENT)
Age: 44
End: 2024-07-20

## 2024-09-12 ENCOUNTER — ALLIED HEALTH/NURSE VISIT (OUTPATIENT)
Dept: FAMILY MEDICINE | Facility: CLINIC | Age: 44
End: 2024-09-12
Payer: COMMERCIAL

## 2024-09-12 ENCOUNTER — LAB (OUTPATIENT)
Dept: LAB | Facility: CLINIC | Age: 44
End: 2024-09-12
Payer: COMMERCIAL

## 2024-09-12 VITALS
HEART RATE: 62 BPM | TEMPERATURE: 97.8 F | SYSTOLIC BLOOD PRESSURE: 102 MMHG | OXYGEN SATURATION: 98 % | RESPIRATION RATE: 18 BRPM | DIASTOLIC BLOOD PRESSURE: 60 MMHG

## 2024-09-12 DIAGNOSIS — Z00.00 HEALTHCARE MAINTENANCE: Primary | ICD-10-CM

## 2024-09-12 DIAGNOSIS — F90.9 ATTENTION DEFICIT HYPERACTIVITY DISORDER: Primary | ICD-10-CM

## 2024-09-12 LAB
AMPHETAMINES UR QL SCN: ABNORMAL
BARBITURATES UR QL SCN: ABNORMAL
BENZODIAZ UR QL SCN: ABNORMAL
BZE UR QL SCN: ABNORMAL
CANNABINOIDS UR QL SCN: ABNORMAL
FENTANYL UR QL: ABNORMAL
OPIATES UR QL SCN: ABNORMAL
PCP QUAL URINE (ROCHE): ABNORMAL

## 2024-09-12 PROCEDURE — 80307 DRUG TEST PRSMV CHEM ANLYZR: CPT

## 2024-09-12 PROCEDURE — G0480 DRUG TEST DEF 1-7 CLASSES: HCPCS | Mod: 90

## 2024-09-12 PROCEDURE — 99207 PR NO CHARGE NURSE ONLY: CPT

## 2024-09-12 NOTE — PROGRESS NOTES
I met with Britney Jenkins at the request of Jacqui Bunch to recheck her blood pressure.  Blood pressure medications on the med list were reviewed with patient.    Patient has taken all medications as per usual regimen: Yes  Patient reports tolerating them without any issues or concerns: Yes    Vitals:    09/12/24 1407   BP: 102/60   Pulse: 62   Resp: 18   Temp: 97.8  F (36.6  C)   TempSrc: Temporal   SpO2: 98%       Blood pressure was taken, previous encounter was reviewed, recorded blood pressure below 140/90.  Patient was discharged and the note will be sent to the provider for final review.    Per patient vitals faxed to [facility] @ [phone]

## 2024-09-16 LAB
AMPHET UR-MCNC: 1323 NG/ML
MDA UR-MCNC: <200 NG/ML
MDEA UR-MCNC: <200 NG/ML
MDMA UR-MCNC: <200 NG/ML
METHAMPHET UR-MCNC: <200 NG/ML
PHENTERMINE UR CFM-MCNC: <200 NG/ML

## 2024-09-28 ENCOUNTER — HEALTH MAINTENANCE LETTER (OUTPATIENT)
Age: 44
End: 2024-09-28

## 2025-08-09 ENCOUNTER — HEALTH MAINTENANCE LETTER (OUTPATIENT)
Age: 45
End: 2025-08-09